# Patient Record
Sex: FEMALE | Race: WHITE | NOT HISPANIC OR LATINO | Employment: OTHER | URBAN - NONMETROPOLITAN AREA
[De-identification: names, ages, dates, MRNs, and addresses within clinical notes are randomized per-mention and may not be internally consistent; named-entity substitution may affect disease eponyms.]

---

## 2023-03-10 DIAGNOSIS — K21.9 GASTROESOPHAGEAL REFLUX DISEASE WITHOUT ESOPHAGITIS: Primary | ICD-10-CM

## 2023-03-13 DIAGNOSIS — G43.809 OTHER MIGRAINE WITHOUT STATUS MIGRAINOSUS, NOT INTRACTABLE: Primary | ICD-10-CM

## 2023-03-13 RX ORDER — HYDROXYZINE PAMOATE 25 MG/1
25 CAPSULE ORAL
COMMUNITY

## 2023-03-13 RX ORDER — PRAVASTATIN SODIUM 80 MG/1
80 TABLET ORAL DAILY
COMMUNITY
End: 2023-09-01

## 2023-03-13 RX ORDER — ETODOLAC 400 MG/1
400 TABLET, FILM COATED ORAL
COMMUNITY
Start: 2022-06-20

## 2023-03-13 RX ORDER — SUMATRIPTAN SUCCINATE 100 MG/1
100 TABLET ORAL
COMMUNITY
Start: 2018-09-24 | End: 2023-03-13 | Stop reason: SDUPTHER

## 2023-03-13 RX ORDER — DONEPEZIL HYDROCHLORIDE 5 MG/1
5 TABLET, FILM COATED ORAL NIGHTLY
COMMUNITY
Start: 2022-12-19

## 2023-03-13 RX ORDER — CARBIDOPA AND LEVODOPA 25; 100 MG/1; MG/1
1 TABLET ORAL
COMMUNITY
Start: 2023-01-13

## 2023-03-13 RX ORDER — CLOPIDOGREL BISULFATE 75 MG/1
75 TABLET ORAL DAILY
COMMUNITY
End: 2024-02-23

## 2023-03-13 RX ORDER — TALC
3 POWDER (GRAM) TOPICAL
COMMUNITY
Start: 2023-02-09

## 2023-03-13 RX ORDER — CITALOPRAM 40 MG/1
40 TABLET, FILM COATED ORAL
COMMUNITY

## 2023-03-13 RX ORDER — SUMATRIPTAN SUCCINATE 100 MG/1
100 TABLET ORAL ONCE AS NEEDED
Qty: 9 TABLET | Refills: 3 | Status: SHIPPED | OUTPATIENT
Start: 2023-03-13 | End: 2024-03-12

## 2023-03-13 RX ORDER — KETOROLAC TROMETHAMINE 5 MG/ML
1 SOLUTION OPHTHALMIC
COMMUNITY
Start: 2021-06-29

## 2023-03-13 RX ORDER — HYDROCHLOROTHIAZIDE 25 MG/1
25 TABLET ORAL DAILY
COMMUNITY
Start: 2023-01-31 | End: 2023-07-19

## 2023-03-13 RX ORDER — LISINOPRIL 40 MG/1
40 TABLET ORAL DAILY
COMMUNITY
End: 2023-04-05

## 2023-03-13 RX ORDER — ALLOPURINOL 300 MG/1
300 TABLET ORAL DAILY
COMMUNITY

## 2023-03-13 RX ORDER — CARVEDILOL 12.5 MG/1
12.5 TABLET ORAL
COMMUNITY
Start: 2013-05-11 | End: 2023-07-19

## 2023-03-13 RX ORDER — FAMOTIDINE 20 MG/1
TABLET, FILM COATED ORAL
Qty: 90 TABLET | Refills: 1 | Status: SHIPPED | OUTPATIENT
Start: 2023-03-13 | End: 2023-09-01

## 2023-03-13 RX ORDER — CLONAZEPAM 1 MG/1
1 TABLET ORAL 3 TIMES DAILY
COMMUNITY

## 2023-03-13 RX ORDER — ERGOCALCIFEROL 1.25 MG/1
50000 CAPSULE ORAL
COMMUNITY
End: 2023-07-19

## 2023-03-13 RX ORDER — FAMOTIDINE 20 MG/1
20 TABLET, FILM COATED ORAL DAILY
COMMUNITY

## 2023-03-13 RX ORDER — POTASSIUM CHLORIDE 1500 MG/1
20 TABLET, EXTENDED RELEASE ORAL DAILY
COMMUNITY
End: 2023-07-19

## 2023-03-13 RX ORDER — PREDNISOLONE ACETATE 10 MG/ML
SUSPENSION/ DROPS OPHTHALMIC
COMMUNITY
Start: 2021-06-29

## 2023-03-21 ENCOUNTER — TELEPHONE (OUTPATIENT)
Dept: PRIMARY CARE | Facility: CLINIC | Age: 68
End: 2023-03-21
Payer: MEDICARE

## 2023-03-31 PROBLEM — G89.29 CHRONIC RIGHT SHOULDER PAIN: Status: ACTIVE | Noted: 2023-03-31

## 2023-03-31 PROBLEM — G20.A1 PARKINSON'S DISEASE (MULTI): Status: ACTIVE | Noted: 2023-03-31

## 2023-03-31 PROBLEM — L85.3 XEROSIS OF SKIN: Status: ACTIVE | Noted: 2023-03-31

## 2023-03-31 PROBLEM — M79.7 FIBROMYALGIA: Status: ACTIVE | Noted: 2023-03-31

## 2023-03-31 PROBLEM — M51.369 BULGING LUMBAR DISC: Status: ACTIVE | Noted: 2023-03-31

## 2023-03-31 PROBLEM — R73.09 ELEVATED GLUCOSE: Status: ACTIVE | Noted: 2023-03-31

## 2023-03-31 PROBLEM — M70.62 GREATER TROCHANTERIC BURSITIS OF LEFT HIP: Status: ACTIVE | Noted: 2023-03-31

## 2023-03-31 PROBLEM — E55.9 VITAMIN D DEFICIENCY: Status: ACTIVE | Noted: 2023-03-31

## 2023-03-31 PROBLEM — S09.93XA FACIAL TRAUMA: Status: ACTIVE | Noted: 2023-03-31

## 2023-03-31 PROBLEM — N20.0 MULTIPLE KIDNEY STONES: Status: ACTIVE | Noted: 2023-03-31

## 2023-03-31 PROBLEM — I63.9 CVA (CEREBRAL VASCULAR ACCIDENT) (MULTI): Status: ACTIVE | Noted: 2023-03-31

## 2023-03-31 PROBLEM — R07.81 RIB PAIN: Status: ACTIVE | Noted: 2023-03-31

## 2023-03-31 PROBLEM — K57.32 DIVERTICULITIS, COLON: Status: ACTIVE | Noted: 2023-03-31

## 2023-03-31 PROBLEM — F32.A ANXIETY AND DEPRESSION: Status: ACTIVE | Noted: 2023-03-31

## 2023-03-31 PROBLEM — G43.909 MIGRAINE: Status: ACTIVE | Noted: 2023-03-31

## 2023-03-31 PROBLEM — M70.71 BURSITIS OF BOTH HIPS: Status: ACTIVE | Noted: 2023-03-31

## 2023-03-31 PROBLEM — M54.50 LOW BACK PAIN POTENTIALLY ASSOCIATED WITH RADICULOPATHY: Status: ACTIVE | Noted: 2023-03-31

## 2023-03-31 PROBLEM — M54.17 LUMBOSACRAL RADICULOPATHY AT L5: Status: ACTIVE | Noted: 2023-03-31

## 2023-03-31 PROBLEM — M51.36 BULGING LUMBAR DISC: Status: ACTIVE | Noted: 2023-03-31

## 2023-03-31 PROBLEM — M79.609 LIMB PAIN: Status: ACTIVE | Noted: 2023-03-31

## 2023-03-31 PROBLEM — E66.811 CLASS 1 OBESITY WITH BODY MASS INDEX (BMI) OF 32.0 TO 32.9 IN ADULT: Status: ACTIVE | Noted: 2023-03-31

## 2023-03-31 PROBLEM — R53.83 FATIGUE: Status: ACTIVE | Noted: 2023-03-31

## 2023-03-31 PROBLEM — M51.26 LUMBAR HERNIATED DISC: Status: ACTIVE | Noted: 2023-03-31

## 2023-03-31 PROBLEM — M54.17 LUMBOSACRAL NEURITIS: Status: ACTIVE | Noted: 2023-03-31

## 2023-03-31 PROBLEM — M25.511 CHRONIC RIGHT SHOULDER PAIN: Status: ACTIVE | Noted: 2023-03-31

## 2023-03-31 PROBLEM — E66.9 CLASS 1 OBESITY WITH BODY MASS INDEX (BMI) OF 32.0 TO 32.9 IN ADULT: Status: ACTIVE | Noted: 2023-03-31

## 2023-03-31 PROBLEM — M25.50 PAIN, JOINT, MULTIPLE SITES: Status: ACTIVE | Noted: 2023-03-31

## 2023-03-31 PROBLEM — N39.0 URINARY TRACT INFECTION: Status: ACTIVE | Noted: 2023-03-31

## 2023-03-31 PROBLEM — M96.1 POSTLAMINECTOMY SYNDROME: Status: ACTIVE | Noted: 2023-03-31

## 2023-03-31 PROBLEM — R55 SYNCOPE: Status: ACTIVE | Noted: 2023-03-31

## 2023-03-31 PROBLEM — M25.551 RIGHT HIP PAIN: Status: ACTIVE | Noted: 2023-03-31

## 2023-03-31 PROBLEM — M25.539 WRIST PAIN: Status: ACTIVE | Noted: 2023-03-31

## 2023-03-31 PROBLEM — M19.011 ARTHRITIS OF SHOULDER REGION, RIGHT: Status: ACTIVE | Noted: 2023-03-31

## 2023-03-31 PROBLEM — M19.90 ARTHRITIS: Status: ACTIVE | Noted: 2023-03-31

## 2023-03-31 PROBLEM — S09.90XA HEAD TRAUMA: Status: ACTIVE | Noted: 2023-03-31

## 2023-03-31 PROBLEM — A04.72 CLOSTRIDIUM DIFFICILE COLITIS: Status: ACTIVE | Noted: 2023-03-31

## 2023-03-31 PROBLEM — M25.569 KNEE PAIN: Status: ACTIVE | Noted: 2023-03-31

## 2023-03-31 PROBLEM — M54.59 LUMBAR FACET JOINT PAIN: Status: ACTIVE | Noted: 2023-03-31

## 2023-03-31 PROBLEM — L85.1 ACQUIRED PLANTAR POROKERATOSIS: Status: ACTIVE | Noted: 2023-03-31

## 2023-03-31 PROBLEM — M51.369 DDD (DEGENERATIVE DISC DISEASE), LUMBAR: Status: ACTIVE | Noted: 2023-03-31

## 2023-03-31 PROBLEM — R21 RASH: Status: ACTIVE | Noted: 2023-03-31

## 2023-03-31 PROBLEM — L03.90 CELLULITIS: Status: ACTIVE | Noted: 2023-03-31

## 2023-03-31 PROBLEM — R63.0 LOSS OF APPETITE FOR MORE THAN 2 WEEKS: Status: ACTIVE | Noted: 2023-03-31

## 2023-03-31 PROBLEM — Z86.39 HISTORY OF HYPOKALEMIA: Status: ACTIVE | Noted: 2023-03-31

## 2023-03-31 PROBLEM — M70.72 BURSITIS OF BOTH HIPS: Status: ACTIVE | Noted: 2023-03-31

## 2023-03-31 PROBLEM — K21.9 GERD (GASTROESOPHAGEAL REFLUX DISEASE): Status: ACTIVE | Noted: 2023-03-31

## 2023-03-31 PROBLEM — F41.9 ANXIETY AND DEPRESSION: Status: ACTIVE | Noted: 2023-03-31

## 2023-03-31 PROBLEM — M54.50 LUMBAGO: Status: ACTIVE | Noted: 2023-03-31

## 2023-03-31 PROBLEM — R63.4 UNEXPLAINED WEIGHT LOSS: Status: ACTIVE | Noted: 2023-03-31

## 2023-03-31 PROBLEM — E78.5 DYSLIPIDEMIA: Status: ACTIVE | Noted: 2023-03-31

## 2023-03-31 PROBLEM — M51.36 DDD (DEGENERATIVE DISC DISEASE), LUMBAR: Status: ACTIVE | Noted: 2023-03-31

## 2023-03-31 PROBLEM — M54.16 LUMBAR BACK PAIN WITH RADICULOPATHY AFFECTING LOWER EXTREMITY: Status: ACTIVE | Noted: 2023-03-31

## 2023-03-31 PROBLEM — I10 HYPERTENSION: Status: ACTIVE | Noted: 2023-03-31

## 2023-04-03 ENCOUNTER — OFFICE VISIT (OUTPATIENT)
Dept: PRIMARY CARE | Facility: CLINIC | Age: 68
End: 2023-04-03
Payer: MEDICARE

## 2023-04-03 VITALS
BODY MASS INDEX: 23.75 KG/M2 | DIASTOLIC BLOOD PRESSURE: 89 MMHG | HEIGHT: 66 IN | SYSTOLIC BLOOD PRESSURE: 135 MMHG | HEART RATE: 69 BPM | WEIGHT: 147.8 LBS

## 2023-04-03 DIAGNOSIS — I63.89 CEREBROVASCULAR ACCIDENT (CVA) DUE TO OTHER MECHANISM (MULTI): ICD-10-CM

## 2023-04-03 DIAGNOSIS — H04.129 DRY EYE: ICD-10-CM

## 2023-04-03 DIAGNOSIS — G20.A1 PARKINSON'S DISEASE (MULTI): Primary | ICD-10-CM

## 2023-04-03 PROCEDURE — 3075F SYST BP GE 130 - 139MM HG: CPT | Performed by: FAMILY MEDICINE

## 2023-04-03 PROCEDURE — 1126F AMNT PAIN NOTED NONE PRSNT: CPT | Performed by: FAMILY MEDICINE

## 2023-04-03 PROCEDURE — 1159F MED LIST DOCD IN RCRD: CPT | Performed by: FAMILY MEDICINE

## 2023-04-03 PROCEDURE — 3079F DIAST BP 80-89 MM HG: CPT | Performed by: FAMILY MEDICINE

## 2023-04-03 PROCEDURE — 99214 OFFICE O/P EST MOD 30 MIN: CPT | Performed by: FAMILY MEDICINE

## 2023-04-03 RX ORDER — MEMANTINE HYDROCHLORIDE 5 MG/1
5 TABLET ORAL 2 TIMES DAILY
Qty: 60 TABLET | Refills: 4 | COMMUNITY
Start: 2023-02-03 | End: 2023-06-18

## 2023-04-03 NOTE — PROGRESS NOTES
Subjective   Patient ID: Marlene Joseph is a 67 y.o. female who presents for Pre-op Clearance (She is having eye surgery with Dr. Cordero. Unknown date. ).  HPI  A 67 year White F presenting for a followup visit:    Tear duct issues: She is scheduled for dacryocystorhinostomy with insertion of Russell tube of right eye. She believes she will be getting general anesthesia. They are doing this at the office in [facility]. She has not had issues with sedation or anesthesia in the past. No issues with CP, SOB, dizziness, palpitations, or leg swelling.      Diverticulosis: She went to the hospital with abdominal pain. She states that the pain felt similar to kidney stones. CT showed no issues with kidney stones, but she has evidence of diverticulosis. She took dicyclomine once and has been doing better since then, has dietary questions.      HTN: Currently on lisinopril, HCTZ, and carvedilol.      Anxiety and depression: on citalopram, clonazepam TID, and hydroxyzine. Seeing psych twice per year, Dr. Felix. No changes.      Migraines: she is on sumatriptan prn for migraines. She has not needed this much since menopause.     h/o CVA: On plavix, no issues.     Hip pain: Doing well off pain meds.      Gout: On allopurinol, no SE's.     GERD: Sx controlled on famotidine.      HLD: On pravastatin, no SE's on this medication.      All other systems have been reviewed and are negative for complaint     Review of Systems    Objective   Physical Exam  Gen: No acute distress. Alert and oriented x3.   HEENT: Normocephalic, atraumatic. PERRLA and EOMI, no conjunctival injection. B/L EAC are clear, TM's viewed are WNL. No rhinorrhea, no oropharyngeal lesions.  Neck: No lymphadenopathy, thyroid WNL.  CV: Regular rate and rhythm. Normal S1/S2.  Resp: CTAB/L. No wheezes or rhonchi appreciated.  Abdomen: Soft. Nontender. Nondistended. Bowel sounds normoactive. No guarding or rigidity.  Derm: Skin is warm and dry. No rashes  appreciated or suspicious lesions noted.   Neuro: Cranial nerves intact. Normal gait.  Psych: Appropriate mood and affect. Normal speech and eye contact.   Extremities: No deformities appreciated. No severe edema.     Assessment/Plan        68yo female here for a followup;    #Dry eye syndrome  Clear from my perspective to proceed with eye surgery  Stopplavix 3 days prior to surgery    #Dementia  Stopped donepeizil   Currently on namenda and carbidopa-levodopa  Following with neuro ()     #Diverticulosis  on dicyclomine prn  recommended to avoid nuts, seeds     #Anxiety and depression:  controlled on citalopram, hydroxyzine, and klonopin  seeing psychiatry q6mo     #Kidney stones  on allopurinol     #HTN:  controlled on carvedilol, and lisinopril, and HCTZ     #H/O NAFLD:  LFT's WNL, continue to monitor     #HLD:  controlled with pravastatin     #GERD:  stop omeprazole  start famotidine, rx sent in today     #Migraines:  stable/asymptomatic, she has sumatriptan for emergencies      HCM:  Mammogram ordered for August UTD for flu and COVID   Cologuard due this year

## 2023-04-05 DIAGNOSIS — I10 PRIMARY HYPERTENSION: Primary | ICD-10-CM

## 2023-04-05 RX ORDER — LISINOPRIL 40 MG/1
TABLET ORAL
Qty: 90 TABLET | Refills: 1 | Status: SHIPPED | OUTPATIENT
Start: 2023-04-05 | End: 2023-09-18

## 2023-04-17 ENCOUNTER — OFFICE VISIT (OUTPATIENT)
Dept: PRIMARY CARE | Facility: CLINIC | Age: 68
End: 2023-04-17
Payer: MEDICARE

## 2023-04-17 VITALS
HEIGHT: 66 IN | HEART RATE: 76 BPM | BODY MASS INDEX: 23.85 KG/M2 | WEIGHT: 148.4 LBS | SYSTOLIC BLOOD PRESSURE: 131 MMHG | DIASTOLIC BLOOD PRESSURE: 84 MMHG

## 2023-04-17 DIAGNOSIS — N20.0 MULTIPLE KIDNEY STONES: ICD-10-CM

## 2023-04-17 DIAGNOSIS — N39.0 URINARY TRACT INFECTION WITHOUT HEMATURIA, SITE UNSPECIFIED: ICD-10-CM

## 2023-04-17 DIAGNOSIS — G20.A1 PARKINSON'S DISEASE (MULTI): Primary | ICD-10-CM

## 2023-04-17 PROCEDURE — 99213 OFFICE O/P EST LOW 20 MIN: CPT | Performed by: REGISTERED NURSE

## 2023-04-17 PROCEDURE — 3075F SYST BP GE 130 - 139MM HG: CPT | Performed by: REGISTERED NURSE

## 2023-04-17 PROCEDURE — 3079F DIAST BP 80-89 MM HG: CPT | Performed by: REGISTERED NURSE

## 2023-04-17 PROCEDURE — 1159F MED LIST DOCD IN RCRD: CPT | Performed by: REGISTERED NURSE

## 2023-04-17 ASSESSMENT — ENCOUNTER SYMPTOMS
SHORTNESS OF BREATH: 0
WEAKNESS: 0
CHILLS: 0
RHINORRHEA: 0
NERVOUS/ANXIOUS: 0
DIFFICULTY URINATING: 0
FEVER: 0
ABDOMINAL PAIN: 0
WOUND: 0
EYE REDNESS: 0
COUGH: 0
DIARRHEA: 0
CONFUSION: 0
HEADACHES: 0
EYE DISCHARGE: 0
VOMITING: 0
WHEEZING: 0
CONSTIPATION: 0
NAUSEA: 0
FREQUENCY: 0
DIZZINESS: 0

## 2023-04-17 NOTE — PROGRESS NOTES
Patient ID: Marlene Joseph is a 67 y.o. female who presents for follow up:     HPI  A 67 year White F presenting for a followup visit:    Fall: 2 thursdays ago, She is unsure what happened, She has been having fall since last summer, she has multiple bruises. She is seeing Dr. Ayon, getting tilt table. May 4th in [facility]. Going to [facility] and fell there. Did not trip up steps, fell in the bathtub a couple of times.     Tear duct issues: She is scheduled for dacryocystorhinostomy with insertion of Russell tube of right eye. She believes she will be getting general anesthesia. They are doing this at the office in [facility]. She has not had issues with sedation or anesthesia in the past. No issues with CP, SOB, dizziness, palpitations, or leg swelling.      Diverticulosis: She went to the hospital with abdominal pain. She states that the pain felt similar to kidney stones. CT showed no issues with kidney stones, but she has evidence of diverticulosis. She took dicyclomine once and has been doing better since then, has dietary questions.      HTN: Currently on lisinopril, HCTZ, and carvedilol.      Anxiety and depression: on citalopram, clonazepam TID, and hydroxyzine. Seeing psych twice per year, Dr. Felix. No changes.      Migraines: she is on sumatriptan prn for migraines. She has not needed this much since menopause.     h/o CVA: On plavix, no issues.     Hip pain: Doing well off pain meds.     She has been falling. She has      Gout: On allopurinol, no SE's.     GERD: Sx controlled on famotidine.      HLD: On pravastatin, no SE's on this medication.      All other systems have been reviewed and are negative for complaint       Review of Systems   Constitutional:  Negative for chills and fever.   HENT:  Negative for congestion, ear pain and rhinorrhea.    Eyes:  Negative for discharge and redness.   Respiratory:  Negative for cough, shortness of breath and wheezing.    Cardiovascular:   "Negative for chest pain and leg swelling.   Gastrointestinal:  Negative for abdominal pain, constipation, diarrhea, nausea and vomiting.   Genitourinary:  Negative for difficulty urinating, frequency and urgency.   Musculoskeletal:  Negative for gait problem.   Skin:  Negative for rash and wound.   Neurological:  Negative for dizziness, weakness and headaches.   Psychiatric/Behavioral:  Negative for confusion. The patient is not nervous/anxious.        Objective   /84 (BP Location: Right arm)   Pulse 76   Ht 1.676 m (5' 6\")   Wt 67.3 kg (148 lb 6.4 oz)   BMI 23.95 kg/m²     Physical Exam  Vitals reviewed.   Constitutional:       Appearance: Normal appearance.   HENT:      Head: Normocephalic.      Right Ear: Tympanic membrane, ear canal and external ear normal.      Left Ear: Tympanic membrane, ear canal and external ear normal.      Nose: No rhinorrhea.      Mouth/Throat:      Mouth: Mucous membranes are moist.      Pharynx: Oropharynx is clear.   Eyes:      Pupils: Pupils are equal, round, and reactive to light.   Cardiovascular:      Rate and Rhythm: Normal rate and regular rhythm.      Pulses: Normal pulses.   Pulmonary:      Effort: Pulmonary effort is normal.      Breath sounds: Normal breath sounds.   Abdominal:      General: Abdomen is flat. Bowel sounds are normal.      Palpations: Abdomen is soft.   Musculoskeletal:         General: No tenderness. Normal range of motion.      Right lower leg: No edema.      Left lower leg: No edema.   Lymphadenopathy:      Cervical: No cervical adenopathy.   Skin:     General: Skin is warm and dry.      Findings: Bruising present. No rash.   Neurological:      Mental Status: She is alert and oriented to person, place, and time.   Psychiatric:         Mood and Affect: Mood normal.         Behavior: Behavior normal.         Assessment/Plan       #Dry eye syndrome  Clear from my perspective to proceed with eye surgery  Stopplavix 3 days prior to " surgery    #Dementia  Stopped donepeizil   Currently on namenda and carbidopa-levodopa  Following with neuro ()  Follow up after testing done with Dr. Lira      #Diverticulosis  on dicyclomine prn  recommended to avoid nuts, seeds     #Anxiety and depression:  controlled on citalopram, hydroxyzine, and klonopin  seeing psychiatry q6mo     #Kidney stones  on allopurinol     #HTN:  controlled on carvedilol, and lisinopril, and HCTZ     #H/O NAFLD:  LFT's WNL, continue to monitor     #HLD:  controlled with pravastatin     #GERD:  stop omeprazole  start famotidine, rx sent in today     #Migraines:  stable/asymptomatic, she has sumatriptan for emergencies      HCM:  Mammogram ordered for August UTD for flu and COVID   Cologuard negative 8/22

## 2023-04-27 ENCOUNTER — TELEPHONE (OUTPATIENT)
Dept: PRIMARY CARE | Facility: CLINIC | Age: 68
End: 2023-04-27
Payer: MEDICARE

## 2023-04-27 NOTE — TELEPHONE ENCOUNTER
Okay that is fine. Does she need us to type up a note and fax it to wherever she is getting the tilt table? Or does she need to pick it up?

## 2023-04-27 NOTE — TELEPHONE ENCOUNTER
Ntf pt, stated she does not think they a note, told her to call back if they need one.     Bisi Barton MA

## 2023-04-27 NOTE — TELEPHONE ENCOUNTER
Pt is having a tilt table test on May 4th. They told her to stop her carvedilol, hydroxyzine, lisinopril, and hydrochlorathiazide. And she just needs the ok by her PCP.      Bisi Barton MA

## 2023-05-01 ENCOUNTER — TELEPHONE (OUTPATIENT)
Dept: PRIMARY CARE | Facility: CLINIC | Age: 68
End: 2023-05-01
Payer: MEDICARE

## 2023-05-01 NOTE — TELEPHONE ENCOUNTER
"Patient previously called about getting the OK to stop a few medications prior to tilt test (see previous telephone note). She called today to say she does indeed need a note and she will pick it up.     \"Pt is having a tilt table test on May 4th. They told her to stop her carvedilol, hydroxyzine, lisinopril, and hydrochlorathiazide. And she just needs the ok by her PCP.   \"  "

## 2023-05-01 NOTE — LETTER
May 1, 2023     Marlene Joseph  [address] [place] [id number]    Patient: Marlene Joseph   YOB: 1955   Date of Visit: 5/1/2023       To whom it may concern,     Marlene Joseph is a patient under my care. She is okay to temporarily stop taking carvedilol, hydroxyzine, lisinopril, and hydrochlorothiazide.     Thank you,         Nichole Mcginnis, CNP
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
## 2023-06-14 PROBLEM — R29.898 WEAKNESS OF LEFT LOWER EXTREMITY: Status: ACTIVE | Noted: 2022-06-29

## 2023-06-14 PROBLEM — H25.12: Status: ACTIVE | Noted: 2021-07-12

## 2023-07-19 DIAGNOSIS — Z86.39 PERSONAL HISTORY OF OTHER ENDOCRINE, NUTRITIONAL AND METABOLIC DISEASE: ICD-10-CM

## 2023-07-19 DIAGNOSIS — I10 ESSENTIAL (PRIMARY) HYPERTENSION: ICD-10-CM

## 2023-07-19 DIAGNOSIS — E55.9 VITAMIN D DEFICIENCY: ICD-10-CM

## 2023-07-19 RX ORDER — ERGOCALCIFEROL 1.25 MG/1
CAPSULE ORAL
Qty: 12 CAPSULE | Refills: 1 | Status: SHIPPED | OUTPATIENT
Start: 2023-07-19 | End: 2023-12-08

## 2023-07-19 RX ORDER — POTASSIUM CHLORIDE 1500 MG/1
TABLET, EXTENDED RELEASE ORAL
Qty: 90 TABLET | Refills: 1 | Status: SHIPPED | OUTPATIENT
Start: 2023-07-19

## 2023-07-19 RX ORDER — HYDROCHLOROTHIAZIDE 25 MG/1
TABLET ORAL
Qty: 90 TABLET | Refills: 1 | Status: SHIPPED | OUTPATIENT
Start: 2023-07-19 | End: 2023-12-08

## 2023-07-19 RX ORDER — CARVEDILOL 12.5 MG/1
TABLET ORAL
Qty: 180 TABLET | Refills: 1 | Status: SHIPPED | OUTPATIENT
Start: 2023-07-19 | End: 2024-02-23

## 2023-09-01 DIAGNOSIS — K21.9 GASTROESOPHAGEAL REFLUX DISEASE WITHOUT ESOPHAGITIS: ICD-10-CM

## 2023-09-01 DIAGNOSIS — E78.5 HYPERLIPIDEMIA, UNSPECIFIED: ICD-10-CM

## 2023-09-01 RX ORDER — FAMOTIDINE 20 MG/1
TABLET, FILM COATED ORAL
Qty: 90 TABLET | Refills: 1 | Status: SHIPPED | OUTPATIENT
Start: 2023-09-01

## 2023-09-01 RX ORDER — PRAVASTATIN SODIUM 80 MG/1
80 TABLET ORAL DAILY
Qty: 90 TABLET | Refills: 1 | Status: SHIPPED | OUTPATIENT
Start: 2023-09-01 | End: 2024-04-01

## 2023-09-17 DIAGNOSIS — I10 PRIMARY HYPERTENSION: ICD-10-CM

## 2023-09-18 RX ORDER — LISINOPRIL 40 MG/1
TABLET ORAL
Qty: 90 TABLET | Refills: 1 | Status: SHIPPED | OUTPATIENT
Start: 2023-09-18

## 2023-12-08 DIAGNOSIS — E55.9 VITAMIN D DEFICIENCY: ICD-10-CM

## 2023-12-08 DIAGNOSIS — I10 ESSENTIAL (PRIMARY) HYPERTENSION: ICD-10-CM

## 2023-12-08 RX ORDER — HYDROCHLOROTHIAZIDE 25 MG/1
TABLET ORAL
Qty: 90 TABLET | Refills: 1 | Status: SHIPPED | OUTPATIENT
Start: 2023-12-08

## 2023-12-08 RX ORDER — ERGOCALCIFEROL 1.25 MG/1
CAPSULE ORAL
Qty: 12 CAPSULE | Refills: 1 | Status: SHIPPED | OUTPATIENT
Start: 2023-12-08

## 2024-02-22 DIAGNOSIS — K21.9 GASTROESOPHAGEAL REFLUX DISEASE, UNSPECIFIED WHETHER ESOPHAGITIS PRESENT: ICD-10-CM

## 2024-02-22 DIAGNOSIS — I63.9 CEREBROVASCULAR ACCIDENT (CVA), UNSPECIFIED MECHANISM (MULTI): ICD-10-CM

## 2024-02-23 DIAGNOSIS — I10 ESSENTIAL (PRIMARY) HYPERTENSION: ICD-10-CM

## 2024-02-23 RX ORDER — CARVEDILOL 12.5 MG/1
12.5 TABLET ORAL
Qty: 180 TABLET | Refills: 1 | Status: SHIPPED | OUTPATIENT
Start: 2024-02-23

## 2024-02-23 RX ORDER — CLOPIDOGREL BISULFATE 75 MG/1
75 TABLET ORAL DAILY
Qty: 180 TABLET | Refills: 1 | Status: SHIPPED | OUTPATIENT
Start: 2024-02-23

## 2024-03-30 DIAGNOSIS — E78.5 HYPERLIPIDEMIA, UNSPECIFIED: ICD-10-CM

## 2024-04-01 RX ORDER — PRAVASTATIN SODIUM 80 MG/1
80 TABLET ORAL DAILY
Qty: 90 TABLET | Refills: 1 | Status: SHIPPED | OUTPATIENT
Start: 2024-04-01

## 2024-06-08 DIAGNOSIS — I63.9 CEREBROVASCULAR ACCIDENT (CVA), UNSPECIFIED MECHANISM (MULTI): ICD-10-CM

## 2024-06-08 DIAGNOSIS — E55.9 VITAMIN D DEFICIENCY: ICD-10-CM

## 2024-06-08 DIAGNOSIS — K21.9 GASTROESOPHAGEAL REFLUX DISEASE WITHOUT ESOPHAGITIS: ICD-10-CM

## 2024-06-08 DIAGNOSIS — I10 PRIMARY HYPERTENSION: ICD-10-CM

## 2024-06-08 DIAGNOSIS — I10 ESSENTIAL (PRIMARY) HYPERTENSION: ICD-10-CM

## 2024-06-10 RX ORDER — FAMOTIDINE 20 MG/1
TABLET, FILM COATED ORAL
Qty: 90 TABLET | Refills: 1 | OUTPATIENT
Start: 2024-06-10

## 2024-06-10 RX ORDER — ERGOCALCIFEROL 1.25 MG/1
CAPSULE ORAL
Qty: 12 CAPSULE | Refills: 1 | OUTPATIENT
Start: 2024-06-10

## 2024-06-10 RX ORDER — CARVEDILOL 12.5 MG/1
12.5 TABLET ORAL
Qty: 180 TABLET | Refills: 1 | OUTPATIENT
Start: 2024-06-10

## 2024-06-10 RX ORDER — LISINOPRIL 40 MG/1
TABLET ORAL
Qty: 90 TABLET | Refills: 1 | OUTPATIENT
Start: 2024-06-10

## 2024-06-14 DIAGNOSIS — I10 ESSENTIAL (PRIMARY) HYPERTENSION: ICD-10-CM

## 2024-06-14 DIAGNOSIS — G43.809 OTHER MIGRAINE WITHOUT STATUS MIGRAINOSUS, NOT INTRACTABLE: ICD-10-CM

## 2024-06-14 DIAGNOSIS — K21.9 GASTROESOPHAGEAL REFLUX DISEASE WITHOUT ESOPHAGITIS: ICD-10-CM

## 2024-06-14 RX ORDER — SUMATRIPTAN SUCCINATE 100 MG/1
TABLET ORAL
Qty: 9 TABLET | Refills: 3 | Status: SHIPPED | OUTPATIENT
Start: 2024-06-14

## 2024-06-14 RX ORDER — FAMOTIDINE 20 MG/1
TABLET, FILM COATED ORAL
Qty: 90 TABLET | Refills: 1 | Status: SHIPPED | OUTPATIENT
Start: 2024-06-14

## 2024-06-14 RX ORDER — HYDROCHLOROTHIAZIDE 25 MG/1
TABLET ORAL
Qty: 90 TABLET | Refills: 1 | Status: SHIPPED | OUTPATIENT
Start: 2024-06-14

## 2024-06-14 RX ORDER — ERGOCALCIFEROL 1.25 MG/1
1 CAPSULE ORAL
Qty: 12 CAPSULE | Refills: 1 | Status: SHIPPED | OUTPATIENT
Start: 2024-06-16

## 2024-06-14 RX ORDER — CLOPIDOGREL BISULFATE 75 MG/1
75 TABLET ORAL DAILY
Qty: 180 TABLET | Refills: 1 | Status: SHIPPED | OUTPATIENT
Start: 2024-06-14

## 2024-06-14 RX ORDER — CARVEDILOL 12.5 MG/1
12.5 TABLET ORAL
Qty: 180 TABLET | Refills: 1 | Status: SHIPPED | OUTPATIENT
Start: 2024-06-14

## 2024-06-18 DIAGNOSIS — I10 PRIMARY HYPERTENSION: ICD-10-CM

## 2024-06-18 RX ORDER — LISINOPRIL 40 MG/1
TABLET ORAL
Qty: 90 TABLET | Refills: 1 | OUTPATIENT
Start: 2024-06-18

## 2024-06-18 RX ORDER — LISINOPRIL 40 MG/1
40 TABLET ORAL DAILY
Qty: 90 TABLET | Refills: 0 | Status: SHIPPED | OUTPATIENT
Start: 2024-06-18

## 2024-08-19 DIAGNOSIS — Z86.39 PERSONAL HISTORY OF OTHER ENDOCRINE, NUTRITIONAL AND METABOLIC DISEASE: ICD-10-CM

## 2024-08-19 RX ORDER — POTASSIUM CHLORIDE 1500 MG/1
TABLET, EXTENDED RELEASE ORAL
Qty: 90 TABLET | Refills: 1 | Status: SHIPPED | OUTPATIENT
Start: 2024-08-19

## 2024-09-13 DIAGNOSIS — I10 PRIMARY HYPERTENSION: ICD-10-CM

## 2024-09-13 RX ORDER — LISINOPRIL 40 MG/1
40 TABLET ORAL DAILY
Qty: 90 TABLET | Refills: 0 | Status: SHIPPED | OUTPATIENT
Start: 2024-09-13

## 2024-09-22 DIAGNOSIS — E78.5 HYPERLIPIDEMIA, UNSPECIFIED: ICD-10-CM

## 2024-09-23 RX ORDER — PRAVASTATIN SODIUM 80 MG/1
80 TABLET ORAL DAILY
Qty: 90 TABLET | Refills: 0 | Status: SHIPPED | OUTPATIENT
Start: 2024-09-23

## 2024-09-25 DIAGNOSIS — I10 PRIMARY HYPERTENSION: ICD-10-CM

## 2024-09-25 DIAGNOSIS — E78.5 HYPERLIPIDEMIA, UNSPECIFIED: ICD-10-CM

## 2024-09-25 RX ORDER — LISINOPRIL 40 MG/1
40 TABLET ORAL DAILY
Qty: 90 TABLET | Refills: 0 | OUTPATIENT
Start: 2024-09-25

## 2024-09-25 RX ORDER — PRAVASTATIN SODIUM 80 MG/1
80 TABLET ORAL DAILY
Qty: 90 TABLET | Refills: 0 | OUTPATIENT
Start: 2024-09-25

## 2024-10-24 DIAGNOSIS — I10 PRIMARY HYPERTENSION: ICD-10-CM

## 2024-10-24 DIAGNOSIS — E55.9 VITAMIN D DEFICIENCY: ICD-10-CM

## 2024-10-24 DIAGNOSIS — K21.9 GASTROESOPHAGEAL REFLUX DISEASE WITHOUT ESOPHAGITIS: ICD-10-CM

## 2024-10-24 DIAGNOSIS — E78.5 HYPERLIPIDEMIA, UNSPECIFIED: ICD-10-CM

## 2024-10-25 RX ORDER — ERGOCALCIFEROL 1.25 MG/1
1 CAPSULE ORAL
Qty: 12 CAPSULE | Refills: 1 | Status: SHIPPED | OUTPATIENT
Start: 2024-10-27

## 2024-10-25 RX ORDER — LISINOPRIL 40 MG/1
40 TABLET ORAL DAILY
Qty: 90 TABLET | Refills: 0 | Status: SHIPPED | OUTPATIENT
Start: 2024-10-25

## 2024-10-25 RX ORDER — PRAVASTATIN SODIUM 80 MG/1
80 TABLET ORAL DAILY
Qty: 90 TABLET | Refills: 0 | Status: SHIPPED | OUTPATIENT
Start: 2024-10-25

## 2024-10-25 RX ORDER — FAMOTIDINE 20 MG/1
TABLET, FILM COATED ORAL
Qty: 90 TABLET | Refills: 1 | Status: SHIPPED | OUTPATIENT
Start: 2024-10-25

## 2024-10-29 ENCOUNTER — APPOINTMENT (OUTPATIENT)
Dept: PRIMARY CARE | Facility: CLINIC | Age: 69
End: 2024-10-29
Payer: MEDICARE

## 2024-10-29 VITALS
BODY MASS INDEX: 23.24 KG/M2 | SYSTOLIC BLOOD PRESSURE: 132 MMHG | DIASTOLIC BLOOD PRESSURE: 70 MMHG | HEART RATE: 75 BPM | WEIGHT: 144 LBS

## 2024-10-29 DIAGNOSIS — Z12.31 ENCOUNTER FOR SCREENING MAMMOGRAM FOR MALIGNANT NEOPLASM OF BREAST: ICD-10-CM

## 2024-10-29 DIAGNOSIS — Z13.220 SCREENING FOR HYPERLIPIDEMIA: ICD-10-CM

## 2024-10-29 DIAGNOSIS — G20.A1 PARKINSON'S DISEASE, UNSPECIFIED WHETHER DYSKINESIA PRESENT, UNSPECIFIED WHETHER MANIFESTATIONS FLUCTUATE: ICD-10-CM

## 2024-10-29 DIAGNOSIS — Z00.00 HEALTHCARE MAINTENANCE: Primary | ICD-10-CM

## 2024-10-29 DIAGNOSIS — E78.5 HYPERLIPIDEMIA, UNSPECIFIED HYPERLIPIDEMIA TYPE: ICD-10-CM

## 2024-10-29 DIAGNOSIS — F03.90 DEMENTIA WITHOUT BEHAVIORAL DISTURBANCE (MULTI): ICD-10-CM

## 2024-10-29 PROCEDURE — G0439 PPPS, SUBSEQ VISIT: HCPCS | Performed by: FAMILY MEDICINE

## 2024-10-29 PROCEDURE — 1170F FXNL STATUS ASSESSED: CPT | Performed by: FAMILY MEDICINE

## 2024-10-29 PROCEDURE — 1124F ACP DISCUSS-NO DSCNMKR DOCD: CPT | Performed by: FAMILY MEDICINE

## 2024-10-29 PROCEDURE — 3075F SYST BP GE 130 - 139MM HG: CPT | Performed by: FAMILY MEDICINE

## 2024-10-29 PROCEDURE — 1159F MED LIST DOCD IN RCRD: CPT | Performed by: FAMILY MEDICINE

## 2024-10-29 PROCEDURE — 3078F DIAST BP <80 MM HG: CPT | Performed by: FAMILY MEDICINE

## 2024-10-29 ASSESSMENT — ACTIVITIES OF DAILY LIVING (ADL)
BATHING: INDEPENDENT
DOING_HOUSEWORK: INDEPENDENT
DRESSING: INDEPENDENT
MANAGING_FINANCES: INDEPENDENT
TAKING_MEDICATION: INDEPENDENT
GROCERY_SHOPPING: INDEPENDENT

## 2024-10-29 ASSESSMENT — PATIENT HEALTH QUESTIONNAIRE - PHQ9
1. LITTLE INTEREST OR PLEASURE IN DOING THINGS: NOT AT ALL
2. FEELING DOWN, DEPRESSED OR HOPELESS: NOT AT ALL
SUM OF ALL RESPONSES TO PHQ9 QUESTIONS 1 AND 2: 0

## 2024-10-30 ENCOUNTER — LAB (OUTPATIENT)
Dept: LAB | Facility: LAB | Age: 69
End: 2024-10-30
Payer: MEDICARE

## 2024-10-30 DIAGNOSIS — Z00.00 HEALTHCARE MAINTENANCE: ICD-10-CM

## 2024-10-30 DIAGNOSIS — Z13.220 SCREENING FOR HYPERLIPIDEMIA: ICD-10-CM

## 2024-10-30 DIAGNOSIS — E78.5 HYPERLIPIDEMIA, UNSPECIFIED HYPERLIPIDEMIA TYPE: ICD-10-CM

## 2024-10-30 LAB
ALBUMIN SERPL BCP-MCNC: 3.9 G/DL (ref 3.4–5)
ALP SERPL-CCNC: 109 U/L (ref 33–136)
ALT SERPL W P-5'-P-CCNC: 14 U/L (ref 7–45)
ANION GAP SERPL CALC-SCNC: 7 MMOL/L (ref 10–20)
AST SERPL W P-5'-P-CCNC: 20 U/L (ref 9–39)
BASOPHILS # BLD AUTO: 0.04 X10*3/UL (ref 0–0.1)
BASOPHILS NFR BLD AUTO: 0.6 %
BILIRUB SERPL-MCNC: 0.3 MG/DL (ref 0–1.2)
BUN SERPL-MCNC: 23 MG/DL (ref 6–23)
CALCIUM SERPL-MCNC: 9.9 MG/DL (ref 8.6–10.3)
CHLORIDE SERPL-SCNC: 105 MMOL/L (ref 98–107)
CHOLEST SERPL-MCNC: 152 MG/DL (ref 0–199)
CHOLESTEROL/HDL RATIO: 4
CO2 SERPL-SCNC: 33 MMOL/L (ref 21–32)
CREAT SERPL-MCNC: 1.07 MG/DL (ref 0.5–1.05)
EGFRCR SERPLBLD CKD-EPI 2021: 56 ML/MIN/1.73M*2
EOSINOPHIL # BLD AUTO: 0.14 X10*3/UL (ref 0–0.4)
EOSINOPHIL NFR BLD AUTO: 2 %
ERYTHROCYTE [DISTWIDTH] IN BLOOD BY AUTOMATED COUNT: 12.9 % (ref 11.5–14.5)
GLUCOSE SERPL-MCNC: 104 MG/DL (ref 74–99)
HCT VFR BLD AUTO: 42 % (ref 36–46)
HDLC SERPL-MCNC: 37.8 MG/DL
HGB BLD-MCNC: 13.9 G/DL (ref 12–16)
IMM GRANULOCYTES # BLD AUTO: 0.02 X10*3/UL (ref 0–0.5)
IMM GRANULOCYTES NFR BLD AUTO: 0.3 % (ref 0–0.9)
LDLC SERPL CALC-MCNC: 66 MG/DL
LYMPHOCYTES # BLD AUTO: 2.88 X10*3/UL (ref 0.8–3)
LYMPHOCYTES NFR BLD AUTO: 40.9 %
MCH RBC QN AUTO: 28.7 PG (ref 26–34)
MCHC RBC AUTO-ENTMCNC: 33.1 G/DL (ref 32–36)
MCV RBC AUTO: 87 FL (ref 80–100)
MONOCYTES # BLD AUTO: 0.56 X10*3/UL (ref 0.05–0.8)
MONOCYTES NFR BLD AUTO: 8 %
NEUTROPHILS # BLD AUTO: 3.4 X10*3/UL (ref 1.6–5.5)
NEUTROPHILS NFR BLD AUTO: 48.2 %
NON HDL CHOLESTEROL: 114 MG/DL (ref 0–149)
NRBC BLD-RTO: 0 /100 WBCS (ref 0–0)
PLATELET # BLD AUTO: 271 X10*3/UL (ref 150–450)
POTASSIUM SERPL-SCNC: 4.5 MMOL/L (ref 3.5–5.3)
PROT SERPL-MCNC: 6.4 G/DL (ref 6.4–8.2)
RBC # BLD AUTO: 4.84 X10*6/UL (ref 4–5.2)
SODIUM SERPL-SCNC: 140 MMOL/L (ref 136–145)
TRIGL SERPL-MCNC: 241 MG/DL (ref 0–149)
VLDL: 48 MG/DL (ref 0–40)
WBC # BLD AUTO: 7 X10*3/UL (ref 4.4–11.3)

## 2024-10-30 PROCEDURE — 36415 COLL VENOUS BLD VENIPUNCTURE: CPT

## 2024-10-30 PROCEDURE — 85025 COMPLETE CBC W/AUTO DIFF WBC: CPT

## 2024-10-30 PROCEDURE — 80053 COMPREHEN METABOLIC PANEL: CPT

## 2024-10-30 PROCEDURE — 80061 LIPID PANEL: CPT

## 2024-10-31 ENCOUNTER — HOSPITAL ENCOUNTER (OUTPATIENT)
Dept: RADIOLOGY | Facility: HOSPITAL | Age: 69
Discharge: HOME | End: 2024-10-31
Payer: MEDICARE

## 2024-10-31 VITALS — BODY MASS INDEX: 21.97 KG/M2 | HEIGHT: 67 IN | WEIGHT: 140 LBS

## 2024-10-31 DIAGNOSIS — Z12.31 ENCOUNTER FOR SCREENING MAMMOGRAM FOR MALIGNANT NEOPLASM OF BREAST: ICD-10-CM

## 2024-10-31 PROCEDURE — 77063 BREAST TOMOSYNTHESIS BI: CPT

## 2024-11-02 ENCOUNTER — APPOINTMENT (OUTPATIENT)
Dept: CARDIOLOGY | Facility: HOSPITAL | Age: 69
End: 2024-11-02
Payer: MEDICARE

## 2024-11-02 ENCOUNTER — APPOINTMENT (OUTPATIENT)
Dept: RADIOLOGY | Facility: HOSPITAL | Age: 69
End: 2024-11-02
Payer: MEDICARE

## 2024-11-02 ENCOUNTER — HOSPITAL ENCOUNTER (EMERGENCY)
Facility: HOSPITAL | Age: 69
Discharge: LEFT BEFORE DISCHARGE | End: 2024-11-03
Attending: EMERGENCY MEDICINE
Payer: MEDICARE

## 2024-11-02 VITALS
WEIGHT: 145 LBS | TEMPERATURE: 97.8 F | DIASTOLIC BLOOD PRESSURE: 82 MMHG | SYSTOLIC BLOOD PRESSURE: 148 MMHG | HEIGHT: 67 IN | BODY MASS INDEX: 22.76 KG/M2 | HEART RATE: 71 BPM | RESPIRATION RATE: 10 BRPM | OXYGEN SATURATION: 96 %

## 2024-11-02 DIAGNOSIS — G89.29 CHRONIC BILATERAL LOW BACK PAIN WITH RIGHT-SIDED SCIATICA: ICD-10-CM
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
## 2024-11-02 DIAGNOSIS — Z53.29 LEFT AGAINST MEDICAL ADVICE: ICD-10-CM

## 2024-11-02 DIAGNOSIS — M54.41 CHRONIC BILATERAL LOW BACK PAIN WITH RIGHT-SIDED SCIATICA: ICD-10-CM

## 2024-11-02 DIAGNOSIS — D72.820 LYMPHOCYTOSIS: ICD-10-CM

## 2024-11-02 DIAGNOSIS — M25.552 LEFT HIP PAIN: Primary | ICD-10-CM

## 2024-11-02 LAB
ALBUMIN SERPL BCP-MCNC: 4 G/DL (ref 3.4–5)
ALP SERPL-CCNC: 121 U/L (ref 33–136)
ALT SERPL W P-5'-P-CCNC: 18 U/L (ref 7–45)
ANION GAP SERPL CALC-SCNC: 8 MMOL/L (ref 10–20)
APPEARANCE UR: CLEAR
APTT PPP: 29 SECONDS (ref 27–38)
AST SERPL W P-5'-P-CCNC: 22 U/L (ref 9–39)
BASOPHILS # BLD AUTO: 0.03 X10*3/UL (ref 0–0.1)
BASOPHILS # BLD AUTO: 0.05 X10*3/UL (ref 0–0.1)
BASOPHILS NFR BLD AUTO: 0.2 %
BASOPHILS NFR BLD AUTO: 0.3 %
BILIRUB SERPL-MCNC: 0.5 MG/DL (ref 0–1.2)
BILIRUB UR STRIP.AUTO-MCNC: NEGATIVE MG/DL
BUN SERPL-MCNC: 23 MG/DL (ref 6–23)
CALCIUM SERPL-MCNC: 9.8 MG/DL (ref 8.6–10.3)
CHLORIDE SERPL-SCNC: 104 MMOL/L (ref 98–107)
CO2 SERPL-SCNC: 30 MMOL/L (ref 21–32)
COLOR UR: YELLOW
CREAT SERPL-MCNC: 1 MG/DL (ref 0.5–1.05)
EGFRCR SERPLBLD CKD-EPI 2021: 60 ML/MIN/1.73M*2
EOSINOPHIL # BLD AUTO: 0.02 X10*3/UL (ref 0–0.4)
EOSINOPHIL # BLD AUTO: 0.1 X10*3/UL (ref 0–0.4)
EOSINOPHIL NFR BLD AUTO: 0.2 %
EOSINOPHIL NFR BLD AUTO: 0.6 %
ERYTHROCYTE [DISTWIDTH] IN BLOOD BY AUTOMATED COUNT: 12.5 % (ref 11.5–14.5)
ERYTHROCYTE [DISTWIDTH] IN BLOOD BY AUTOMATED COUNT: 12.5 % (ref 11.5–14.5)
GLUCOSE SERPL-MCNC: 99 MG/DL (ref 74–99)
GLUCOSE UR STRIP.AUTO-MCNC: NEGATIVE MG/DL
HCT VFR BLD AUTO: 38.1 % (ref 36–46)
HCT VFR BLD AUTO: 40.7 % (ref 36–46)
HGB BLD-MCNC: 13.2 G/DL (ref 12–16)
HGB BLD-MCNC: 14 G/DL (ref 12–16)
HOLD SPECIMEN: NORMAL
HOLD SPECIMEN: NORMAL
IMM GRANULOCYTES # BLD AUTO: 0.04 X10*3/UL (ref 0–0.5)
IMM GRANULOCYTES # BLD AUTO: 0.1 X10*3/UL (ref 0–0.5)
IMM GRANULOCYTES NFR BLD AUTO: 0.3 % (ref 0–0.9)
IMM GRANULOCYTES NFR BLD AUTO: 0.6 % (ref 0–0.9)
INR PPP: 1 (ref 0.9–1.1)
KETONES UR STRIP.AUTO-MCNC: NEGATIVE MG/DL
LACTATE SERPL-SCNC: 0.8 MMOL/L (ref 0.4–2)
LEUKOCYTE ESTERASE UR QL STRIP.AUTO: NEGATIVE
LYMPHOCYTES # BLD AUTO: 1.49 X10*3/UL (ref 0.8–3)
LYMPHOCYTES # BLD AUTO: 1.87 X10*3/UL (ref 0.8–3)
LYMPHOCYTES NFR BLD AUTO: 12 %
LYMPHOCYTES NFR BLD AUTO: 12 %
MCH RBC QN AUTO: 28.9 PG (ref 26–34)
MCH RBC QN AUTO: 29 PG (ref 26–34)
MCHC RBC AUTO-ENTMCNC: 34.4 G/DL (ref 32–36)
MCHC RBC AUTO-ENTMCNC: 34.6 G/DL (ref 32–36)
MCV RBC AUTO: 83 FL (ref 80–100)
MCV RBC AUTO: 84 FL (ref 80–100)
MONOCYTES # BLD AUTO: 0.93 X10*3/UL (ref 0.05–0.8)
MONOCYTES # BLD AUTO: 1 X10*3/UL (ref 0.05–0.8)
MONOCYTES NFR BLD AUTO: 6.4 %
MONOCYTES NFR BLD AUTO: 7.5 %
NEUTROPHILS # BLD AUTO: 12.49 X10*3/UL (ref 1.6–5.5)
NEUTROPHILS # BLD AUTO: 9.89 X10*3/UL (ref 1.6–5.5)
NEUTROPHILS NFR BLD AUTO: 79.8 %
NEUTROPHILS NFR BLD AUTO: 80.1 %
NITRITE UR QL STRIP.AUTO: NEGATIVE
NRBC BLD-RTO: 0 /100 WBCS (ref 0–0)
NRBC BLD-RTO: 0 /100 WBCS (ref 0–0)
PH UR STRIP.AUTO: 7 [PH]
PLATELET # BLD AUTO: 220 X10*3/UL (ref 150–450)
PLATELET # BLD AUTO: 248 X10*3/UL (ref 150–450)
POTASSIUM SERPL-SCNC: 3.6 MMOL/L (ref 3.5–5.3)
PROT SERPL-MCNC: 6.8 G/DL (ref 6.4–8.2)
PROT UR STRIP.AUTO-MCNC: NEGATIVE MG/DL
PROTHROMBIN TIME: 11.7 SECONDS (ref 9.8–12.8)
RBC # BLD AUTO: 4.57 X10*6/UL (ref 4–5.2)
RBC # BLD AUTO: 4.82 X10*6/UL (ref 4–5.2)
RBC # UR STRIP.AUTO: NEGATIVE /UL
SODIUM SERPL-SCNC: 138 MMOL/L (ref 136–145)
SP GR UR STRIP.AUTO: 1.01
UROBILINOGEN UR STRIP.AUTO-MCNC: <2 MG/DL
WBC # BLD AUTO: 12.4 X10*3/UL (ref 4.4–11.3)
WBC # BLD AUTO: 15.6 X10*3/UL (ref 4.4–11.3)

## 2024-11-02 PROCEDURE — 96375 TX/PRO/DX INJ NEW DRUG ADDON: CPT

## 2024-11-02 PROCEDURE — 99285 EMERGENCY DEPT VISIT HI MDM: CPT | Mod: 25

## 2024-11-02 PROCEDURE — 76377 3D RENDER W/INTRP POSTPROCES: CPT

## 2024-11-02 PROCEDURE — 36415 COLL VENOUS BLD VENIPUNCTURE: CPT | Performed by: EMERGENCY MEDICINE

## 2024-11-02 PROCEDURE — 85610 PROTHROMBIN TIME: CPT | Performed by: EMERGENCY MEDICINE

## 2024-11-02 PROCEDURE — 36415 COLL VENOUS BLD VENIPUNCTURE: CPT | Performed by: FAMILY MEDICINE

## 2024-11-02 PROCEDURE — 96374 THER/PROPH/DIAG INJ IV PUSH: CPT

## 2024-11-02 PROCEDURE — 76377 3D RENDER W/INTRP POSTPROCES: CPT | Mod: LEFT SIDE | Performed by: RADIOLOGY

## 2024-11-02 PROCEDURE — 71045 X-RAY EXAM CHEST 1 VIEW: CPT

## 2024-11-02 PROCEDURE — 85025 COMPLETE CBC W/AUTO DIFF WBC: CPT | Performed by: EMERGENCY MEDICINE

## 2024-11-02 PROCEDURE — 73700 CT LOWER EXTREMITY W/O DYE: CPT | Mod: LEFT SIDE | Performed by: RADIOLOGY

## 2024-11-02 PROCEDURE — 85025 COMPLETE CBC W/AUTO DIFF WBC: CPT | Performed by: FAMILY MEDICINE

## 2024-11-02 PROCEDURE — 85730 THROMBOPLASTIN TIME PARTIAL: CPT | Performed by: EMERGENCY MEDICINE

## 2024-11-02 PROCEDURE — 73700 CT LOWER EXTREMITY W/O DYE: CPT | Mod: LT

## 2024-11-02 PROCEDURE — 80053 COMPREHEN METABOLIC PANEL: CPT | Performed by: EMERGENCY MEDICINE

## 2024-11-02 PROCEDURE — 2500000004 HC RX 250 GENERAL PHARMACY W/ HCPCS (ALT 636 FOR OP/ED)

## 2024-11-02 PROCEDURE — 2500000004 HC RX 250 GENERAL PHARMACY W/ HCPCS (ALT 636 FOR OP/ED): Performed by: EMERGENCY MEDICINE

## 2024-11-02 PROCEDURE — 83605 ASSAY OF LACTIC ACID: CPT | Performed by: FAMILY MEDICINE

## 2024-11-02 PROCEDURE — 93005 ELECTROCARDIOGRAM TRACING: CPT

## 2024-11-02 PROCEDURE — 81003 URINALYSIS AUTO W/O SCOPE: CPT | Performed by: FAMILY MEDICINE

## 2024-11-02 RX ORDER — ONDANSETRON HYDROCHLORIDE 2 MG/ML
INJECTION, SOLUTION INTRAVENOUS
Status: COMPLETED
Start: 2024-11-02 | End: 2024-11-02

## 2024-11-02 RX ORDER — ONDANSETRON HYDROCHLORIDE 2 MG/ML
4 INJECTION, SOLUTION INTRAVENOUS ONCE
Status: COMPLETED | OUTPATIENT
Start: 2024-11-02 | End: 2024-11-02

## 2024-11-02 RX ORDER — MORPHINE SULFATE 4 MG/ML
4 INJECTION INTRAVENOUS ONCE
Status: COMPLETED | OUTPATIENT
Start: 2024-11-02 | End: 2024-11-02

## 2024-11-02 RX ADMIN — ONDANSETRON 4 MG: 2 INJECTION INTRAMUSCULAR; INTRAVENOUS at 19:53

## 2024-11-02 RX ADMIN — ONDANSETRON HYDROCHLORIDE 4 MG: 2 INJECTION, SOLUTION INTRAVENOUS at 19:53

## 2024-11-02 RX ADMIN — MORPHINE SULFATE 4 MG: 4 INJECTION, SOLUTION INTRAMUSCULAR; INTRAVENOUS at 19:57

## 2024-11-02 ASSESSMENT — COLUMBIA-SUICIDE SEVERITY RATING SCALE - C-SSRS
6. HAVE YOU EVER DONE ANYTHING, STARTED TO DO ANYTHING, OR PREPARED TO DO ANYTHING TO END YOUR LIFE?: NO
2. HAVE YOU ACTUALLY HAD ANY THOUGHTS OF KILLING YOURSELF?: NO
1. IN THE PAST MONTH, HAVE YOU WISHED YOU WERE DEAD OR WISHED YOU COULD GO TO SLEEP AND NOT WAKE UP?: NO

## 2024-11-02 ASSESSMENT — PAIN SCALES - GENERAL: PAINLEVEL_OUTOF10: 0 - NO PAIN

## 2024-11-02 ASSESSMENT — PAIN - FUNCTIONAL ASSESSMENT: PAIN_FUNCTIONAL_ASSESSMENT: 0-10

## 2024-11-02 NOTE — ED PROVIDER NOTES
HPI   Chief Complaint   Patient presents with    Fall     Pt arrived by POV with reports of fall at approx 3;30 this afternoon.  Pt reports L hip pain.  Pt does not recall how she fell.  Denies LOC.  Pt states she is on a blood thinner but does not know which one.         Patient presents via private car with a chief complaint of left hip pain.  The patient fell earlier today onto concrete.  This was a mechanical fall and she states that she tripped.   Patient denies any head trauma or passing out.  Patient states it did not hurt very badly after she fell and she put a lidocaine patch on.  Patient states that her pain is gotten progressively worse however.    ROS:    CONSTITUTIONAL: Denies weight loss, fever and chills    HEENT: Denies changes in vision and hearing, No sore throat    RESPIRATORY: Denies SOB and cough    CV: Denies palpitations or chest pain    GI: Denies abdominal pain, nausea, vomiting and diarrhea    : Denies dysuria and urinary frequency    MUSCULOSKELETAL: Left hip pain    SKIN: Denies rash and pruritus    NEUROLOGICAL: Denies headache and syncope    PSYCHIATRIC: Denies recent changes in mood. Denies anxiety and depression      PHYSICAL EXAM:    GENERAL: Alert and oriented x 3. No acute distress. Well-nourished    EYES: EOMI. Anicteric    HEENT: Moist mucous membranes. No scleral icterus. No cervical lymphadenopathy    LUNGS: Clear to auscultation bilaterally. No accessory muscle use    CARDIOVASCULAR: Regular rate and rhythm. No murmur. No JVD    ABDOMEN: Soft, non-tender and non-distended. No palpable masses    EXTREMITIES: Left hip tenderness to palpation, decreased range of motion.    SKIN: No rashes or lesions    NEUROLOGIC: No focal neurological deficits. CN II-XII grossly intact    PSYCHIATRIC: Cooperative. Appropriate mood and affect      EKG performed at 1933, interpreted by me, shows normal sinus rhythm at 80 bpm      Patient signed out to Dr. Mclain at 2000              Patient  History   Past Medical History:   Diagnosis Date    Fall 08/08/2022    Formatting of this note might be different from the original. 1. Neurology referal 2. decrease Klonopin 1mg BID    Personal history of other diseases of the digestive system     History of esophageal reflux    Personal history of other endocrine, nutritional and metabolic disease     History of hypercholesterolemia    Personal history of other mental and behavioral disorders     History of depression    Personal history of other specified conditions 04/22/2020    History of syncope     Past Surgical History:   Procedure Laterality Date    BACK SURGERY  02/26/2015    Lower Back Surgery    CT ANGIO NECK  7/2/2017    CT NECK ANGIO W AND WO IV CONTRAST 7/2/2017 [facility]    HYSTERECTOMY  08/12/2013    Hysterectomy    MR HEAD ANGIO WO IV CONTRAST  5/14/2014    MR HEAD ANGIO WO IV CONTRAST 5/14/2014 [facility]    OTHER SURGICAL HISTORY  01/07/2019    Rectocele repair    TONSILLECTOMY  08/12/2013    Tonsillectomy     Family History   Problem Relation Name Age of Onset    Diabetes type I Other       Social History     Tobacco Use    Smoking status: Every Day     Types: Cigarettes    Smokeless tobacco: Never   Substance Use Topics    Alcohol use: Not on file    Drug use: Not on file       Physical Exam   ED Triage Vitals [11/02/24 1933]   Temp Heart Rate Respirations BP   -- 62 18 149/85      SpO2 Temp src Heart Rate Source Patient Position   97 % -- -- --      BP Location FiO2 (%)     -- --       Physical Exam      ED Course & MDM   Diagnoses as of 11/08/24 0842   Left hip pain   Fall injury while running   Chronic bilateral low back pain with right-sided sciatica   Lymphocytosis   Left against medical advice                 No data recorded                                 Medical Decision Making      Procedure  Procedures    Diagnoses as of 11/08/24 0842   Left hip pain   Fall injury while running   Chronic bilateral low back pain  with right-sided sciatica   Lymphocytosis   Left against medical advice          Bisi Arriaga,   11/08/24 0842   Patient signed out to Dr. Rayne Arriaga, DO  11/16/24 0832       Bisi Arriaga, DO  11/16/24 0833       Bisi Arriaga, DO  11/16/24 0803

## 2024-11-03 LAB — HOLD SPECIMEN: NORMAL

## 2024-11-03 NOTE — PROGRESS NOTES
This patient was seen evaluated and treated by Dr. Arriaga prior to my arrival.  Please refer to her complete history and physical examination workup and disposition plan she signed out the CT of the left hip to me.  Patient tripped and fell and suffered a left hip injury she has history of chronic back pain and has prior failed lumbar surgery spine surgery she has chronic left-sided lumbar radiculopathy.  Denies injury to head neck chest wall abdomen pelvis.  Denies any chest pain or short of breath dizzy and lightheaded palpitation before or after the fall.  Denies injury to any other part of body except left hip pain she has chronic back pain denies injury to both upper extremity other part of left leg or any injury right leg.  She denies incontinence.  Denies dysuria frequency urgency.  Denies any chest pain abdominal pain vomiting or diarrhea.  She was able to move her right leg she is not moving her left leg because pain she could not lift her however I was able lift her leg normal reflexes, hip any pelvic is stable and abdomen soft neck is supple she has clear breath sound with shallow inspiration and failure.  No tachypnea hypoxemia respiratory regular rate and rhythm.  No facial bruise edema Maria E she was awake alert oriented x 3.  CT of the hip showed no fracture or dislocation she complained of left proximal femur pain located CT images proximal femur look intact and the part of the left hip CT I ordered left femur x-ray and CT of the chest abdomen pelvis because she had leukocytosis white count 15,000 repeat white count of 12,000 she normally has normal white counts.  Urinalysis also pending but she decided no additional workup and wanted to leave as she feels back to her baseline does not want any workup done no additional imaging study she understood risk and benefits well.  She is aware that if she has infection he can be septic septic can cause permanent disability or even death and she does not want  to have further evaluation she appreciated the concern but she want to be discharged  at bedside and he agreed with her.  They understood risk and benefit well patient left AMA.    Myriam Mclain MD

## 2024-11-03 NOTE — DISCHARGE INSTRUCTIONS
As discussed the exact cause of your elevated white count is undetermined you have refused CAT scan of the chest abdomen pelvis to rule out any infection in the chest abdomen or spine.  Infection can cause sepsis pulmonary disability or even death.  If decided to complete evaluation done return to ER.  Follow-up primary care physician.

## 2024-11-05 LAB
ATRIAL RATE: 60 BPM
P AXIS: 53 DEGREES
P OFFSET: 186 MS
P ONSET: 153 MS
PR INTERVAL: 140 MS
Q ONSET: 223 MS
QRS COUNT: 10 BEATS
QRS DURATION: 76 MS
QT INTERVAL: 480 MS
QTC CALCULATION(BAZETT): 480 MS
QTC FREDERICIA: 480 MS
R AXIS: 15 DEGREES
T AXIS: 54 DEGREES
T OFFSET: 463 MS
VENTRICULAR RATE: 60 BPM

## 2024-11-11 NOTE — PROGRESS NOTES
Subjective   Patient ID: Marlene Joseph is a 69 y.o. female who presents for Fall (She feel about 10 days ago. She was in the ed after  the fall. They put on her discharge that she was running but she was not. Still having a lot of pain. ).    HPI     Marlene is here today with hip pain and ER follow up.   She fell when she came around the corner, she lost her footing, she did not lose consciousness, denies the feeling of dizziness, headache, shortness of breath, or chestpain when she fell. She is having pain in her left hip and her femur. She is using a walker today and states it is worse, than it was. She had back surgery a while back for a bulging disc and the bottom of her heal has been numb since.      Marlene signed out of the ED AMA, she did have a  CT while she was there showing mild narrowing of the left hip joint, there was no dislocation or fracture, pelvis appeared intact.       10/29/24  She has had 2 siblings die of Parkinson's disease and then 1 sibling  of ALS. She saw Dr. Lira who thought she has Parkinson's disease but ultimately saw neurologist who told her she has parkinsonism (Sister had MSA, mother had PSP). She was taken off the carbidopa levadop and she tried memantine but couldn't tolerate it. Short term memory is extremely bad, no car accidents. Not getting lost. No kitchen accidents. She has been cutting one of her meds in half, can't remember which (clonazepam), but she hasn't fallen since then. She was recommended to do PT and use a cane and a walker but she hasn't been interested in that. She is having a hard time getting into and out of the car.     HTN: Currently on lisinopril, hydrochlorothiazide, and carvedilol.      Anxiety and depression: on citalopram, clonazepam TID, and hydroxyzine. Seeing psych twice per year, Dr. Felix.     Migraines: she is on sumatriptan prn for migraines. She has not needed this much since menopause.     h/o CVA: On plavix, no issues.     Hip  "pain: Doing well off pain meds.      Gout: On allopurinol, no SE's.     GERD: Sx controlled on famotidine daily.      HLD: On pravastatin, no SE's on this medication.      All other systems have been reviewed and are negative for complaint     Objective   Ht 1.702 m (5' 7\")   Wt 65.8 kg (145 lb)   BMI 22.71 kg/m²     Physical Exam  Gen: No acute distress, alert and oriented x3, pleasant   HEENT: moist mucous membranes, b/l external auditory canals are clear of debris, TMs within normal limits, no oropharyngeal lesions, eomi, perrla   Neck: thyroid within normal limits, no lymphadenopathy   CV: RRR, normal S1/S2, no murmur   Resp: Clear to auscultation bilaterally, no wheezes or rhonchi appreciated  Abd: soft, nontender, non-distended, no guarding/rigidity, bowel sounds present  Extr: no edema, no calf tenderness  Derm: Skin is warm and dry, no rashes appreciated  Psych: mood is good, affect is congruent, good hygiene, normal speech and eye contact  Neuro: abnormal gait, unable to put pressure  cranial nerves grossly intact, normal gait        Assessment & Plan    #Fall  #Left hip/thigh pain  Flexeril  Medrol dose pack  XR femur and sacrum coccyx    #Dementia  Currently off memory medications but donepezil is on her list     #Anxiety and depression:  controlled on citalopram, hydroxyzine, and klonopin  seeing psychiatry q6mo     #Kidney stones  on allopurinol     #HTN:  controlled on carvedilol, and lisinopril, and HCTZ     #H/O NAFLD:  LFT's WNL, continue to monitor     #HLD:  controlled with pravastatin     #GERD:  Controlled on famotidine     #Migraines:  stable/asymptomatic, she has sumatriptan for emergencies      HCM:  Mammogram ordered for August   UTD for flu and COVID   Cologuard negative 8/22   "

## 2024-11-12 ENCOUNTER — HOSPITAL ENCOUNTER (OUTPATIENT)
Dept: RADIOLOGY | Facility: HOSPITAL | Age: 69
Discharge: HOME | End: 2024-11-12
Payer: MEDICARE

## 2024-11-12 ENCOUNTER — OFFICE VISIT (OUTPATIENT)
Dept: PRIMARY CARE | Facility: CLINIC | Age: 69
End: 2024-11-12
Payer: MEDICARE

## 2024-11-12 VITALS
HEIGHT: 67 IN | BODY MASS INDEX: 22.76 KG/M2 | WEIGHT: 145 LBS | HEART RATE: 68 BPM | DIASTOLIC BLOOD PRESSURE: 81 MMHG | SYSTOLIC BLOOD PRESSURE: 116 MMHG

## 2024-11-12 DIAGNOSIS — M25.552 LEFT HIP PAIN: ICD-10-CM
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
## 2024-11-12 DIAGNOSIS — M79.652 PAIN OF LEFT THIGH: ICD-10-CM

## 2024-11-12 PROCEDURE — 1159F MED LIST DOCD IN RCRD: CPT

## 2024-11-12 PROCEDURE — 73552 X-RAY EXAM OF FEMUR 2/>: CPT | Mod: LT

## 2024-11-12 PROCEDURE — 72100 X-RAY EXAM L-S SPINE 2/3 VWS: CPT

## 2024-11-12 PROCEDURE — 72220 X-RAY EXAM SACRUM TAILBONE: CPT | Performed by: STUDENT IN AN ORGANIZED HEALTH CARE EDUCATION/TRAINING PROGRAM

## 2024-11-12 PROCEDURE — 3074F SYST BP LT 130 MM HG: CPT

## 2024-11-12 PROCEDURE — 99213 OFFICE O/P EST LOW 20 MIN: CPT

## 2024-11-12 PROCEDURE — 72220 X-RAY EXAM SACRUM TAILBONE: CPT

## 2024-11-12 PROCEDURE — 3008F BODY MASS INDEX DOCD: CPT

## 2024-11-12 PROCEDURE — 3079F DIAST BP 80-89 MM HG: CPT

## 2024-11-12 PROCEDURE — 1160F RVW MEDS BY RX/DR IN RCRD: CPT

## 2024-11-12 PROCEDURE — 72100 X-RAY EXAM L-S SPINE 2/3 VWS: CPT | Performed by: STUDENT IN AN ORGANIZED HEALTH CARE EDUCATION/TRAINING PROGRAM

## 2024-11-12 PROCEDURE — 73552 X-RAY EXAM OF FEMUR 2/>: CPT | Mod: LEFT SIDE | Performed by: STUDENT IN AN ORGANIZED HEALTH CARE EDUCATION/TRAINING PROGRAM

## 2024-11-12 RX ORDER — METHYLPREDNISOLONE 4 MG/1
TABLET ORAL
Qty: 21 TABLET | Refills: 0 | Status: SHIPPED | OUTPATIENT
Start: 2024-11-12 | End: 2024-11-19

## 2024-11-12 RX ORDER — CYCLOBENZAPRINE HCL 10 MG
10 TABLET ORAL 3 TIMES DAILY PRN
Qty: 30 TABLET | Refills: 0 | Status: SHIPPED | OUTPATIENT
Start: 2024-11-12 | End: 2025-01-11

## 2024-11-15 ENCOUNTER — TELEPHONE (OUTPATIENT)
Dept: PRIMARY CARE | Facility: CLINIC | Age: 69
End: 2024-11-15
Payer: MEDICARE

## 2024-11-21 ENCOUNTER — OFFICE VISIT (OUTPATIENT)
Dept: URGENT CARE | Facility: URGENT CARE | Age: 69
End: 2024-11-21
Payer: MEDICARE

## 2024-11-21 VITALS
SYSTOLIC BLOOD PRESSURE: 139 MMHG | BODY MASS INDEX: 23.18 KG/M2 | HEART RATE: 65 BPM | RESPIRATION RATE: 16 BRPM | TEMPERATURE: 97.6 F | HEIGHT: 67 IN | DIASTOLIC BLOOD PRESSURE: 88 MMHG | OXYGEN SATURATION: 96 % | WEIGHT: 147.71 LBS

## 2024-11-21 DIAGNOSIS — L30.9 DERMATITIS: Primary | ICD-10-CM

## 2024-11-21 PROCEDURE — 3008F BODY MASS INDEX DOCD: CPT | Performed by: PHYSICIAN ASSISTANT

## 2024-11-21 PROCEDURE — 3075F SYST BP GE 130 - 139MM HG: CPT | Performed by: PHYSICIAN ASSISTANT

## 2024-11-21 PROCEDURE — 99203 OFFICE O/P NEW LOW 30 MIN: CPT | Performed by: PHYSICIAN ASSISTANT

## 2024-11-21 PROCEDURE — 3079F DIAST BP 80-89 MM HG: CPT | Performed by: PHYSICIAN ASSISTANT

## 2024-11-21 RX ORDER — HYDROXYZINE HYDROCHLORIDE 10 MG/1
10 TABLET, FILM COATED ORAL 2 TIMES DAILY PRN
Qty: 20 TABLET | Refills: 0 | Status: SHIPPED | OUTPATIENT
Start: 2024-11-21 | End: 2024-12-01

## 2024-11-21 RX ORDER — TRIAMCINOLONE ACETONIDE 1 MG/G
OINTMENT TOPICAL 2 TIMES DAILY
Qty: 15 G | Refills: 0 | Status: SHIPPED | OUTPATIENT
Start: 2024-11-21 | End: 2024-11-29

## 2024-11-21 ASSESSMENT — ENCOUNTER SYMPTOMS
CONSTITUTIONAL NEGATIVE: 1
CHILLS: 0
FACIAL SWELLING: 0
RESPIRATORY NEGATIVE: 1
CARDIOVASCULAR NEGATIVE: 1
GASTROINTESTINAL NEGATIVE: 1
DIAPHORESIS: 0
FEVER: 0

## 2024-11-21 NOTE — PROGRESS NOTES
Subjective   Patient ID: Marlene Joseph is a 69 y.o. female. They present today with a chief complaint of Rash (Rash x 7 days/).    History of Present Illness  69-year-old female here with complaints of rash for the last 2 days patient had a fall at the beginning of November was seen in the urgent care did not get all the testing back left and then had additional testing outpatient by her family doctor but prior to getting the x-ray she was put on methylprednisone she is completely finished this medication 3 to 4 days ago but patient complaints of having an itchy rash that started to her chest area and is since went to her back upper and lower extremities    Denies any specific known causative agent but was worried it might have been the methylprednisone but does state that she had been off the methylprednisone for several days prior to the rash developing    Patient denies any new lotions creams no new meds she did fall outside but it was witnessed and she did not have any rash to the areas although she did have abrasions to her left lower leg but the rash did not start in this area    No cough congestion fevers chills      Rash  Pertinent negatives include no fever.       Past Medical History  Allergies as of 11/21/2024 - Reviewed 11/21/2024   Allergen Reaction Noted    Asa-calcium carb-mag-aluminum Unknown 11/02/2017    Aspirin Unknown 11/25/2008    Atorvastatin Other 07/02/2014    Donepezil Other 03/20/2023    Moxifloxacin Hallucinations and Unknown 11/25/2008    Penicillin v potassium Unknown 03/31/2023    Sulfa (sulfonamide antibiotics) Unknown 03/31/2023       (Not in a hospital admission)       Past Medical History:   Diagnosis Date    Fall 08/08/2022    Formatting of this note might be different from the original. 1. Neurology referal 2. decrease Klonopin 1mg BID    Personal history of other diseases of the digestive system     History of esophageal reflux    Personal history of other endocrine,  "nutritional and metabolic disease     History of hypercholesterolemia    Personal history of other mental and behavioral disorders     History of depression    Personal history of other specified conditions 04/22/2020    History of syncope    Stroke (Multi)        Past Surgical History:   Procedure Laterality Date    BACK SURGERY  02/26/2015    Lower Back Surgery    CT ANGIO NECK  7/2/2017    CT NECK ANGIO W AND WO IV CONTRAST 7/2/2017 [facility]    HYSTERECTOMY  08/12/2013    Hysterectomy    MR HEAD ANGIO WO IV CONTRAST  5/14/2014    MR HEAD ANGIO WO IV CONTRAST 5/14/2014 [facility]    OTHER SURGICAL HISTORY  01/07/2019    Rectocele repair    TONSILLECTOMY  08/12/2013    Tonsillectomy        reports that she has been smoking cigarettes. She has never used smokeless tobacco. She reports that she does not currently use drugs after having used the following drugs: Marijuana.    Review of Systems  Review of Systems   Constitutional: Negative.  Negative for chills, diaphoresis and fever.   HENT:  Negative for dental problem and facial swelling.    Respiratory: Negative.     Cardiovascular: Negative.    Gastrointestinal: Negative.    Genitourinary: Negative.    Skin:  Positive for rash.        Uticaria                                   Objective    Vitals:    11/21/24 1622   BP: 139/88   Pulse: 65   Resp: 16   Temp: 36.4 °C (97.6 °F)   TempSrc: Oral   SpO2: 96%   Weight: 67 kg (147 lb 11.3 oz)   Height: 1.702 m (5' 7\")     No LMP recorded. Patient is postmenopausal.    Physical Exam  Vitals and nursing note reviewed.   Constitutional:       Appearance: Normal appearance.   HENT:      Head: Normocephalic and atraumatic.      Right Ear: Tympanic membrane, ear canal and external ear normal.      Left Ear: Tympanic membrane, ear canal and external ear normal.      Nose: No congestion or rhinorrhea.      Mouth/Throat:      Mouth: Mucous membranes are moist.   Eyes:      Extraocular Movements: Extraocular " movements intact.      Conjunctiva/sclera: Conjunctivae normal.      Pupils: Pupils are equal, round, and reactive to light.   Neck:      Vascular: No carotid bruit.   Cardiovascular:      Rate and Rhythm: Normal rate and regular rhythm.   Pulmonary:      Effort: Pulmonary effort is normal.      Breath sounds: Normal breath sounds.   Abdominal:      General: Abdomen is flat. Bowel sounds are normal.      Palpations: Abdomen is soft.   Musculoskeletal:         General: Normal range of motion.      Cervical back: Normal range of motion and neck supple. No rigidity or tenderness.   Lymphadenopathy:      Cervical: No cervical adenopathy.   Skin:     Capillary Refill: Capillary refill takes less than 2 seconds.      Findings: Rash present.      Comments: Patient with flat erythematous areas ranging in size from 1 mm to 5 mm with secondary itch starting on her upper chest/sun exposed area but is since moved to her upper back lower extremities and upper extremities with secondary itch  But none noted to the palms  or dorsal hands /finger , does have some areas to her ankle and along the left foot     No burrow areas ,       She does have excoriation and dry skin patches to areas    And now complaining of itch to her scalp and her face but currently I do not see any erythematous papules/rash areas to her neck face or scalp  No scale to the areas  noted to her fingers or hands none to her lower abdomen or waistline   Neurological:      General: No focal deficit present.      Mental Status: She is alert and oriented to person, place, and time.         Procedures    Point of Care Test & Imaging Results from this visit  No results found for this visit on 11/21/24.   No results found.    Diagnostic study results (if any) were reviewed by Niki Swift PA-C.    Assessment/Plan   Allergies, medications, history, and pertinent labs/EKGs/Imaging reviewed by Niki Swift PA-C.     Medical Decision Making  Differential:   1)  scabies   2) contact dermatitis   3) allergic reaction    69-year-old female with multiple allergens and on multiple medications here with complaints of itchy rash that started 2 days ago woke her up from sleep feeling itchy states she does not get outside/go outside they have no animals no one else with a rash denies any history of dry skin or dermatitis had been on methylprednisone but finished the medication several days prior to the rash starting recent fall with tailbone injury/fracture but no open areas to this did have abrasions to her left lower leg that healed without any difficulty no rash surrounding these areas, the initial rash started to her chest and upper back, will treat as a dermatitis prescribed medication to help with the itch and a topical treatment the patient also has extensive dry skin told her to use an oil-based lotion like Eucerin cream or specifically a cream for very dry skin follow-up with her primary doctor for recheck       Plan: Discussed differential with the patient and /or parents family   Patient to  follow up with the PCP in the next 2-3 days  Return for any worsening symptoms or go to the ER for further evaluation. Patient/family/caregiver  understands return   precautions and discharge instructions and is agreeable to the current plan   Impression:        Orders and Diagnoses for this visit    Orders and Diagnoses  There are no diagnoses linked to this encounter.    Medical Admin Record      Patient disposition: Home    Electronically signed by Niki Swift PA-C  4:51 PM

## 2024-11-22 NOTE — PROGRESS NOTES
"Subjective   Patient ID: Marlene Joseph is a 69 y.o. female who presents for Rash (It is all over her body. Blistering up. Started about a week ago. ).    HPI     Was in here 11/12/24 for a fall and was started on flexeril and medrol dose pack. She only took a few of the flexeril pills she had improvement with walking. She finished the medrol dose pack, she then broke out in a rash all over her body it started on her left thigh initially then spread to her chest and back 3 days after completing medrol dose pack. She has been to urgent care twice now for this. States her rash is all over her body and she can not stop itching it. She has taken benadryl, and the atarax, she has used the kenalog cream and has been putting a thick barrier over ointment. Says this gives her some relief. She states she has not been exposed to anything new. She denies having any fevers, shortness of breath,     Objective   /84 (BP Location: Left arm)   Ht 1.702 m (5' 7\")   Wt 66.7 kg (147 lb)   BMI 23.02 kg/m²     Physical Exam  Constitutional:       Appearance: Normal appearance.   HENT:      Head: Normocephalic.      Right Ear: Tympanic membrane normal.      Left Ear: Tympanic membrane normal.      Mouth/Throat:      Mouth: Mucous membranes are moist.      Pharynx: Oropharynx is clear.   Eyes:      Conjunctiva/sclera: Conjunctivae normal.   Cardiovascular:      Rate and Rhythm: Normal rate and regular rhythm.      Pulses: Normal pulses.      Heart sounds: Normal heart sounds.   Pulmonary:      Effort: Pulmonary effort is normal.      Breath sounds: Normal breath sounds.   Musculoskeletal:         General: Normal range of motion.      Cervical back: Normal range of motion.   Skin:     Findings: Lesion and rash present.      Comments:     Large fluid filled blister to left lateral mid foot spreading from dorsal foot to plantar foot, tiny erythematous papules diffuse to B/L feet and lower legs sparing soles, scattered petechia to " dorsal feet and lower legs and wrists. papular rash on anterior chest with diffusely. No rash on face or abdomen.     Neurological:      General: No focal deficit present.      Mental Status: She is alert and oriented to person, place, and time. Mental status is at baseline.   Psychiatric:         Mood and Affect: Mood normal.       Assessment & Plan  Urticaria    Orders:    triamcinolone acetonide (Kenalog-40) injection 60 mg

## 2024-11-25 ENCOUNTER — OFFICE VISIT (OUTPATIENT)
Dept: URGENT CARE | Facility: URGENT CARE | Age: 69
End: 2024-11-25
Payer: MEDICARE

## 2024-11-25 VITALS
HEART RATE: 73 BPM | WEIGHT: 147.71 LBS | OXYGEN SATURATION: 97 % | TEMPERATURE: 98.1 F | DIASTOLIC BLOOD PRESSURE: 103 MMHG | RESPIRATION RATE: 18 BRPM | SYSTOLIC BLOOD PRESSURE: 179 MMHG | BODY MASS INDEX: 23.13 KG/M2

## 2024-11-25 DIAGNOSIS — L29.9 PRURITUS: ICD-10-CM

## 2024-11-25 DIAGNOSIS — L30.9 DERMATITIS: ICD-10-CM

## 2024-11-25 DIAGNOSIS — I16.0 HYPERTENSIVE URGENCY: ICD-10-CM

## 2024-11-25 DIAGNOSIS — L01.00 IMPETIGO: Primary | ICD-10-CM

## 2024-11-25 PROCEDURE — 1159F MED LIST DOCD IN RCRD: CPT | Performed by: PHYSICIAN ASSISTANT

## 2024-11-25 PROCEDURE — 99070 SPECIAL SUPPLIES PHYS/QHP: CPT | Performed by: PHYSICIAN ASSISTANT

## 2024-11-25 PROCEDURE — 1160F RVW MEDS BY RX/DR IN RCRD: CPT | Performed by: PHYSICIAN ASSISTANT

## 2024-11-25 PROCEDURE — 3077F SYST BP >= 140 MM HG: CPT | Performed by: PHYSICIAN ASSISTANT

## 2024-11-25 PROCEDURE — 99214 OFFICE O/P EST MOD 30 MIN: CPT | Performed by: PHYSICIAN ASSISTANT

## 2024-11-25 PROCEDURE — 3080F DIAST BP >= 90 MM HG: CPT | Performed by: PHYSICIAN ASSISTANT

## 2024-11-25 RX ORDER — CEPHALEXIN 500 MG/1
500 CAPSULE ORAL 4 TIMES DAILY
Qty: 30 CAPSULE | Refills: 0 | Status: SHIPPED | OUTPATIENT
Start: 2024-11-25 | End: 2024-12-02

## 2024-11-25 RX ORDER — TRIAMCINOLONE ACETONIDE 1 MG/G
OINTMENT TOPICAL 2 TIMES DAILY
Qty: 30 G | Refills: 0 | Status: SHIPPED | OUTPATIENT
Start: 2024-11-25 | End: 2024-12-03

## 2024-11-25 ASSESSMENT — ENCOUNTER SYMPTOMS: ALLERGIC REACTION: 1

## 2024-11-25 NOTE — PROGRESS NOTES
Subjective   Patient ID: Marlene Joseph is a 69 y.o. female. They present today with a chief complaint of Allergic Reaction (Allergic reaction to steroids, around 1 week ).    History of Present Illness    Allergic Reaction    Pt presents with . She reports worsening itch over past 2-3 weeks. She was seen by PCP office 11/12/24 for accidental fall and prescribed medrol dose mary and muscle relaxer. She took flexeril for 2 days with improved back pain and is walking normally now. Pt has appt with PCP tomorrow at 2:45 pm for this condition. She was seen here on 11/21/24 for itching with rash and prescribed triamcinolone ointment and hydroxyzine 10mg BID PRN. She takes Vistaril 25mg at bedtime and anxiety meds. She feels more anxious due to persistent itching. She feels nothing  is helping her. No wheeze or vomiting. No fever. She is on BP meds however still has elevated BP.    Past Medical History  Allergies as of 11/25/2024 - Reviewed 11/25/2024   Allergen Reaction Noted    Asa-calcium carb-mag-aluminum Unknown 11/02/2017    Aspirin Unknown 11/25/2008    Atorvastatin Other 07/02/2014    Donepezil Other 03/20/2023    Moxifloxacin Hallucinations and Unknown 11/25/2008    Penicillin v potassium Unknown 03/31/2023    Sulfa (sulfonamide antibiotics) Unknown 03/31/2023       (Not in a hospital admission)       Past Medical History:   Diagnosis Date    Fall 08/08/2022    Formatting of this note might be different from the original. 1. Neurology referal 2. decrease Klonopin 1mg BID    Personal history of other diseases of the digestive system     History of esophageal reflux    Personal history of other endocrine, nutritional and metabolic disease     History of hypercholesterolemia    Personal history of other mental and behavioral disorders     History of depression    Personal history of other specified conditions 04/22/2020    History of syncope    Stroke (Multi)        Past Surgical History:   Procedure  Laterality Date    BACK SURGERY  02/26/2015    Lower Back Surgery    CT ANGIO NECK  7/2/2017    CT NECK ANGIO W AND WO IV CONTRAST 7/2/2017 [facility]    HYSTERECTOMY  08/12/2013    Hysterectomy    MR HEAD ANGIO WO IV CONTRAST  5/14/2014    MR HEAD ANGIO WO IV CONTRAST 5/14/2014 [facility]    OTHER SURGICAL HISTORY  01/07/2019    Rectocele repair    TONSILLECTOMY  08/12/2013    Tonsillectomy        reports that she has been smoking cigarettes. She has never used smokeless tobacco. She reports that she does not currently use drugs after having used the following drugs: Marijuana.    Review of Systems  Review of Systems                               Objective    Vitals:    11/25/24 1700   BP: (!) 179/103   BP Location: Left arm   Patient Position: Sitting   BP Cuff Size: Adult long   Pulse: 73   Resp: 18   Temp: 36.7 °C (98.1 °F)   TempSrc: Oral   SpO2: 97%   Weight: 67 kg (147 lb 11.3 oz)     No LMP recorded. Patient is postmenopausal.    Physical Exam  Constitutional:       Appearance: Normal appearance.   Cardiovascular:      Rate and Rhythm: Normal rate and regular rhythm.      Heart sounds: No murmur heard.  Pulmonary:      Effort: Pulmonary effort is normal.      Breath sounds: Normal breath sounds.   Skin:     General: Skin is warm and dry.      Comments: Large fluid filled blister to left lateral mid foot spreading from dorsal foot to plantar foot, tiny erythematous papules diffuse to B/L feet and lower legs sparing soles, scattered petechia to dorsal feet and lower legs and wrists. papular rash on anterior chest with diffusely. No rash on face or abdomen.    Neurological:      General: No focal deficit present.      Mental Status: She is alert.   Psychiatric:      Comments: Anxious female         Procedures    Point of Care Test & Imaging Results from this visit  No results found for this visit on 11/25/24.   No results found.    Diagnostic study results (if any) were reviewed by Sintia ELIZONDO  TESSIE Ferraro.    Assessment/Plan   Allergies, medications, history, and pertinent labs/EKGs/Imaging reviewed by Sintia Ferraro PA-C.     Medical Decision Making  68 yo F presents with itching and rash with large fluid filled blisters x 2-3 weeks occurred after a fall and taking flexeril and medrol dose mary which she completed both. She was seen here 11/12/24 and prescribed triamcinolone ointment and hydroxyzine PRN which are not helping. Vitals with elevated /103, HR 73, SpO2 97%. Exam remarkable for anxious white female,  Large fluid filled blister to left lateral mid foot spreading from dorsal foot to plantar foot, tiny erythematous papules diffuse to B/L feet and lower legs sparing soles, scattered petechia to dorsal feet and lower legs and wrists. Erythematous papular rash diffusely to anterior chest. No rash on face or abdomen.   Reviewed electronic chart last labs 3weeks ago show GFR 58-60 with crea 1.07. Pt denies any hx of CKD.   Discussed Impetigo with blister on left foot- Start Keflex 4 times a day x 7 days; take with food and probiotic. Advised to go to ER if worsening rash or nausea or wheeze; discussed 10 % population with true penicillin allergy may cross react to cephalosporin antibiotic. Apply OTC bacitracin ointment to rash twice a day.    Itching- continue topical steroid cream 2-3 times a day, hydroxyzine twice a day as needed, AVEENO OATMEAL BATH SOAK. Oral or injection steroids held due to elevated BP. No chest pain or dyspnea at rest or exertion or dizziness. Advised to keep follow up appt with PCP tomorrow for BP management and further evaluation of possible causes for itching.     Orders and Diagnoses  Diagnoses and all orders for this visit:  Impetigo  -     cephalexin (Keflex) 500 mg capsule; Take 1 capsule (500 mg) by mouth 4 times a day for 7 days.  Pruritus  Hypertensive urgency      Medical Admin Record      Patient disposition: Home    Electronically signed by  Sintia Ferraro PA-C  6:48 PM

## 2024-11-25 NOTE — PROGRESS NOTES
Patient was seen here last week for this same problem, hives and itching has worsened, patient has large blister on outside of left foot.

## 2024-11-25 NOTE — PATIENT INSTRUCTIONS
Impetigo with blister on left foot- Start Keflex 4 times a day x 7 days; take with food and probiotic. Advised to go to ER if worsening rash or nausea or wheeze; discussed 10 % population with true penicillin allergy may cross react to cephalosporin antibiotic. Apply OTC bacitracin ointment to rash twice a day.    Itching- continue topical steroid cream 2-3 times a day, hydroxyzine twice a day as needed, AVEENO OATMEAL BATH SOAK

## 2024-11-26 ENCOUNTER — APPOINTMENT (OUTPATIENT)
Dept: PRIMARY CARE | Facility: CLINIC | Age: 69
End: 2024-11-26
Payer: MEDICARE

## 2024-11-26 VITALS
WEIGHT: 147 LBS | SYSTOLIC BLOOD PRESSURE: 139 MMHG | DIASTOLIC BLOOD PRESSURE: 84 MMHG | HEIGHT: 67 IN | BODY MASS INDEX: 23.07 KG/M2

## 2024-11-26 DIAGNOSIS — L50.9 URTICARIA: Primary | ICD-10-CM

## 2024-11-26 RX ORDER — TRIAMCINOLONE ACETONIDE 40 MG/ML
60 INJECTION, SUSPENSION INTRA-ARTICULAR; INTRAMUSCULAR ONCE
Status: COMPLETED | OUTPATIENT
Start: 2024-11-26 | End: 2024-11-26

## 2024-12-06 DIAGNOSIS — L30.9 DERMATITIS: ICD-10-CM

## 2024-12-06 RX ORDER — TRIAMCINOLONE ACETONIDE 1 MG/G
OINTMENT TOPICAL 2 TIMES DAILY
Qty: 30 G | Refills: 0 | Status: SHIPPED | OUTPATIENT
Start: 2024-12-06 | End: 2024-12-14

## 2024-12-11 ENCOUNTER — APPOINTMENT (OUTPATIENT)
Dept: PRIMARY CARE | Facility: CLINIC | Age: 69
End: 2024-12-11
Payer: MEDICARE

## 2024-12-13 DIAGNOSIS — I10 ESSENTIAL (PRIMARY) HYPERTENSION: ICD-10-CM

## 2024-12-13 DIAGNOSIS — I10 PRIMARY HYPERTENSION: ICD-10-CM

## 2024-12-13 RX ORDER — HYDROCHLOROTHIAZIDE 25 MG/1
TABLET ORAL
Qty: 90 TABLET | Refills: 1 | Status: SHIPPED | OUTPATIENT
Start: 2024-12-13

## 2024-12-13 RX ORDER — CARVEDILOL 12.5 MG/1
TABLET ORAL
Qty: 180 TABLET | Refills: 1 | Status: SHIPPED | OUTPATIENT
Start: 2024-12-13

## 2024-12-13 RX ORDER — LISINOPRIL 40 MG/1
40 TABLET ORAL DAILY
Qty: 90 TABLET | Refills: 0 | Status: SHIPPED | OUTPATIENT
Start: 2024-12-13

## 2025-01-06 DIAGNOSIS — E78.5 HYPERLIPIDEMIA, UNSPECIFIED: ICD-10-CM

## 2025-01-06 DIAGNOSIS — Z86.39 PERSONAL HISTORY OF OTHER ENDOCRINE, NUTRITIONAL AND METABOLIC DISEASE: ICD-10-CM

## 2025-01-06 RX ORDER — PRAVASTATIN SODIUM 80 MG/1
80 TABLET ORAL DAILY
Qty: 90 TABLET | Refills: 0 | Status: SHIPPED | OUTPATIENT
Start: 2025-01-06

## 2025-01-06 RX ORDER — POTASSIUM CHLORIDE 1500 MG/1
TABLET, EXTENDED RELEASE ORAL
Qty: 90 TABLET | Refills: 1 | Status: SHIPPED | OUTPATIENT
Start: 2025-01-06

## 2025-02-11 ENCOUNTER — TELEPHONE (OUTPATIENT)
Dept: PRIMARY CARE | Facility: CLINIC | Age: 70
End: 2025-02-11
Payer: MEDICARE

## 2025-02-12 NOTE — TELEPHONE ENCOUNTER
It is for low potassium, she had this back in 2021 and I thought she was on potassium since then. If she wants to stop she can, one of her medications makes her potassium low, and if it happens again usually the first symptom is leg cramps, so she can just watch out for that and call back if they arise.

## 2025-02-15 DIAGNOSIS — I63.9 CEREBROVASCULAR ACCIDENT (CVA), UNSPECIFIED MECHANISM (MULTI): ICD-10-CM

## 2025-02-17 RX ORDER — CLOPIDOGREL BISULFATE 75 MG/1
75 TABLET ORAL DAILY
Qty: 90 TABLET | Refills: 3 | Status: SHIPPED | OUTPATIENT
Start: 2025-02-17

## 2025-03-25 DIAGNOSIS — E55.9 VITAMIN D DEFICIENCY: ICD-10-CM

## 2025-03-25 DIAGNOSIS — E78.5 HYPERLIPIDEMIA, UNSPECIFIED: ICD-10-CM

## 2025-03-25 RX ORDER — ERGOCALCIFEROL 1.25 MG/1
1 CAPSULE ORAL
Qty: 12 CAPSULE | Refills: 1 | Status: SHIPPED | OUTPATIENT
Start: 2025-03-25

## 2025-03-25 RX ORDER — PRAVASTATIN SODIUM 80 MG/1
80 TABLET ORAL DAILY
Qty: 90 TABLET | Refills: 0 | Status: SHIPPED | OUTPATIENT
Start: 2025-03-25

## 2025-04-05 DIAGNOSIS — K21.9 GASTROESOPHAGEAL REFLUX DISEASE, UNSPECIFIED WHETHER ESOPHAGITIS PRESENT: Primary | ICD-10-CM

## 2025-04-05 DIAGNOSIS — I10 ESSENTIAL (PRIMARY) HYPERTENSION: ICD-10-CM

## 2025-04-05 DIAGNOSIS — I10 PRIMARY HYPERTENSION: ICD-10-CM

## 2025-04-07 RX ORDER — LISINOPRIL 40 MG/1
40 TABLET ORAL DAILY
Qty: 90 TABLET | Refills: 0 | Status: SHIPPED | OUTPATIENT
Start: 2025-04-07

## 2025-04-07 RX ORDER — FAMOTIDINE 20 MG/1
20 TABLET, FILM COATED ORAL DAILY
Qty: 90 TABLET | Refills: 1 | Status: SHIPPED | OUTPATIENT
Start: 2025-04-07

## 2025-04-07 RX ORDER — HYDROCHLOROTHIAZIDE 25 MG/1
TABLET ORAL
Qty: 90 TABLET | Refills: 1 | Status: SHIPPED | OUTPATIENT
Start: 2025-04-07

## 2025-04-22 NOTE — PROGRESS NOTES
"Subjective   Patient ID: Marlene Joseph is a 69 y.o. female who presents for Follow-up (6 months).    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  (Sister had MSA, mother had PSP). She was taken off the carbidopa levadop and she tried memantine but couldn't tolerate it. She had her clonazepam dose decreased a year or two ago but after that she has had significantly less falling. She is very shaky on her feet. Short term memory is extremely bad, no car accidents. Not getting lost. No kitchen accidents. She has been cutting one of her meds in half, can't remember which (clonazepam), but she hasn't fallen since then. She was recommended to do PT and use a cane and a walker but she hasn't been interested in that. She is having a hard time getting into and out of the car.    Lumbar radiculopathy: She is using lidocaine and aspercreme. She has had bad reactions to pain medications.      HTN: Currently on lisinopril, hydrochlorothiazide, and carvedilol.      Anxiety and depression: on citalopram, clonazepam TID, and hydroxyzine. Seeing psych twice per year, Dr. Felix.     Migraines: she is on sumatriptan prn for migraines. She has not needed this much since menopause.     h/o CVA: On plavix, no issues.     Hip pain: Doing well off pain meds.      Gout: On allopurinol, no SE's.     GERD: Sx controlled on famotidine daily.      HLD: On pravastatin, no SE's on this medication.      All other systems have been reviewed and are negative for complaint     Objective   BP (!) 160/92 (BP Location: Left arm, Patient Position: Sitting, BP Cuff Size: Adult)   Pulse 59   Ht 1.702 m (5' 7\")   Wt 66.7 kg (147 lb)   BMI 23.02 kg/m²     Gen: No acute distress, alert and oriented x3, pleasant   HEENT: moist mucous membranes, b/l external auditory canals are clear of debris, TMs within normal limits, no oropharyngeal lesions, eomi, perrla   Neck: thyroid within normal limits, " no lymphadenopathy   CV: RRR, normal S1/S2, no murmur   Resp: Clear to auscultation bilaterally, no wheezes or rhonchi appreciated  Abd: soft, nontender, non-distended, no guarding/rigidity, bowel sounds present  Extr: no edema, no calf tenderness  Derm: Skin is warm and dry, no rashes appreciated  Psych: mood is good, affect is congruent, good hygiene, normal speech and eye contact  Neuro: cranial nerves grossly intact, normal gait    No data recorded        Assessment/Plan   #Vascular parkinsonism  Currently off memory medications but donepezil is on her list    #Lumbar radiculopathy  SE's on pain meds  On aspercreme and lidocaine patch     #Anxiety and depression:  controlled on citalopram, hydroxyzine, and klonopin  seeing psychiatry q6mo     #Kidney stones  on allopurinol     #HTN:  controlled on carvedilol, and lisinopril, and HCTZ     #H/O NAFLD:  LFT's WNL, continue to monitor     #HLD:  controlled with pravastatin     #GERD:  Controlled on famotidine     #Migraines:  stable/asymptomatic, she has sumatriptan for emergencies      HCM:  Mammogram Oct WNL   UTD for flu and COVID   Cologuard negative 8/22

## 2025-04-28 ENCOUNTER — APPOINTMENT (OUTPATIENT)
Dept: PRIMARY CARE | Facility: CLINIC | Age: 70
End: 2025-04-28
Payer: MEDICARE

## 2025-04-28 VITALS
BODY MASS INDEX: 23.07 KG/M2 | HEIGHT: 67 IN | WEIGHT: 147 LBS | HEART RATE: 59 BPM | SYSTOLIC BLOOD PRESSURE: 133 MMHG | DIASTOLIC BLOOD PRESSURE: 83 MMHG

## 2025-04-28 DIAGNOSIS — Z13.220 SCREENING FOR HYPERLIPIDEMIA: ICD-10-CM

## 2025-04-28 DIAGNOSIS — F03.90 DEMENTIA WITHOUT BEHAVIORAL DISTURBANCE (MULTI): ICD-10-CM

## 2025-04-28 DIAGNOSIS — K21.9 GASTROESOPHAGEAL REFLUX DISEASE WITHOUT ESOPHAGITIS: ICD-10-CM

## 2025-04-28 DIAGNOSIS — G20.B2 PARKINSON'S DISEASE WITH DYSKINESIA AND FLUCTUATING MANIFESTATIONS: ICD-10-CM

## 2025-04-28 DIAGNOSIS — Z00.00 HEALTHCARE MAINTENANCE: ICD-10-CM

## 2025-04-28 DIAGNOSIS — G21.4 VASCULAR PARKINSONISM (MULTI): ICD-10-CM

## 2025-04-28 DIAGNOSIS — N20.0 RECURRENT NEPHROLITHIASIS: Primary | ICD-10-CM

## 2025-04-28 DIAGNOSIS — G20.C PRIMARY PARKINSONISM (MULTI): ICD-10-CM

## 2025-04-28 DIAGNOSIS — M19.90 ARTHRITIS: ICD-10-CM

## 2025-04-28 PROCEDURE — 3079F DIAST BP 80-89 MM HG: CPT | Performed by: FAMILY MEDICINE

## 2025-04-28 PROCEDURE — 3008F BODY MASS INDEX DOCD: CPT | Performed by: FAMILY MEDICINE

## 2025-04-28 PROCEDURE — 99214 OFFICE O/P EST MOD 30 MIN: CPT | Performed by: FAMILY MEDICINE

## 2025-04-28 PROCEDURE — 3075F SYST BP GE 130 - 139MM HG: CPT | Performed by: FAMILY MEDICINE

## 2025-04-28 RX ORDER — DICLOFENAC SODIUM 10 MG/G
4 GEL TOPICAL 4 TIMES DAILY
Qty: 100 G | Refills: 1 | Status: SHIPPED | OUTPATIENT
Start: 2025-04-28

## 2025-05-02 ENCOUNTER — TELEPHONE (OUTPATIENT)
Dept: PRIMARY CARE | Facility: CLINIC | Age: 70
End: 2025-05-02
Payer: MEDICARE

## 2025-05-02 DIAGNOSIS — R05.9 COUGH, UNSPECIFIED TYPE: Primary | ICD-10-CM

## 2025-05-02 RX ORDER — BENZONATATE 100 MG/1
100 CAPSULE ORAL 3 TIMES DAILY PRN
Qty: 42 CAPSULE | Refills: 0 | Status: SHIPPED | OUTPATIENT
Start: 2025-05-02 | End: 2025-06-01

## 2025-05-02 NOTE — TELEPHONE ENCOUNTER
I am sending tessalon perles. Can you ask if it was a liquid or pill? Or if she wants to try this and then report back?

## 2025-05-10 DIAGNOSIS — I10 ESSENTIAL (PRIMARY) HYPERTENSION: ICD-10-CM

## 2025-05-13 RX ORDER — CARVEDILOL 12.5 MG/1
TABLET ORAL
Qty: 180 TABLET | Refills: 1 | Status: SHIPPED | OUTPATIENT
Start: 2025-05-13

## 2025-06-08 DIAGNOSIS — I10 PRIMARY HYPERTENSION: ICD-10-CM

## 2025-06-08 DIAGNOSIS — Z86.39 PERSONAL HISTORY OF OTHER ENDOCRINE, NUTRITIONAL AND METABOLIC DISEASE: ICD-10-CM

## 2025-06-09 RX ORDER — LISINOPRIL 40 MG/1
40 TABLET ORAL DAILY
Qty: 90 TABLET | Refills: 0 | Status: SHIPPED | OUTPATIENT
Start: 2025-06-09

## 2025-06-09 RX ORDER — POTASSIUM CHLORIDE 20 MEQ/1
20 TABLET, EXTENDED RELEASE ORAL DAILY
Qty: 90 TABLET | Refills: 1 | Status: SHIPPED | OUTPATIENT
Start: 2025-06-09

## 2025-06-30 DIAGNOSIS — E78.5 HYPERLIPIDEMIA, UNSPECIFIED: ICD-10-CM

## 2025-06-30 RX ORDER — PRAVASTATIN SODIUM 80 MG/1
80 TABLET ORAL DAILY
Qty: 90 TABLET | Refills: 0 | Status: SHIPPED | OUTPATIENT
Start: 2025-06-30

## 2025-10-28 ENCOUNTER — APPOINTMENT (OUTPATIENT)
Dept: PRIMARY CARE | Facility: CLINIC | Age: 70
End: 2025-10-28
Payer: MEDICARE